# Patient Record
Sex: MALE | Race: WHITE | Employment: OTHER | URBAN - NONMETROPOLITAN AREA
[De-identification: names, ages, dates, MRNs, and addresses within clinical notes are randomized per-mention and may not be internally consistent; named-entity substitution may affect disease eponyms.]

---

## 2020-06-16 ENCOUNTER — HOSPITAL ENCOUNTER (EMERGENCY)
Facility: OTHER | Age: 50
Discharge: HOME | End: 2020-06-16
Attending: EMERGENCY MEDICINE | Admitting: EMERGENCY MEDICINE
Payer: COMMERCIAL

## 2020-06-16 VITALS
HEIGHT: 65 IN | OXYGEN SATURATION: 97 % | WEIGHT: 186.9 LBS | DIASTOLIC BLOOD PRESSURE: 74 MMHG | TEMPERATURE: 99 F | BODY MASS INDEX: 31.14 KG/M2 | HEART RATE: 86 BPM | SYSTOLIC BLOOD PRESSURE: 137 MMHG | RESPIRATION RATE: 20 BRPM

## 2020-06-16 DIAGNOSIS — Z87.19 HISTORY OF GI BLEED: ICD-10-CM

## 2020-06-16 DIAGNOSIS — R10.13 ABDOMINAL PAIN, EPIGASTRIC: ICD-10-CM

## 2020-06-16 DIAGNOSIS — R17 JAUNDICE: ICD-10-CM

## 2020-06-16 PROBLEM — H21.01 HYPHEMA, RIGHT EYE: Status: ACTIVE | Noted: 2017-06-07

## 2020-06-16 PROBLEM — N52.9 ERECTILE DYSFUNCTION: Status: ACTIVE | Noted: 2020-06-16

## 2020-06-16 PROBLEM — E66.9 OBESITY: Status: ACTIVE | Noted: 2020-06-16

## 2020-06-16 PROBLEM — F10.20 MODERATE ALCOHOL DEPENDENCE (H): Status: ACTIVE | Noted: 2020-06-16

## 2020-06-16 PROBLEM — M65.30 ACQUIRED TRIGGER FINGER: Status: ACTIVE | Noted: 2020-06-16

## 2020-06-16 PROBLEM — H93.19 TINNITUS: Status: ACTIVE | Noted: 2020-06-16

## 2020-06-16 PROBLEM — G47.00 INSOMNIA: Status: ACTIVE | Noted: 2020-06-16

## 2020-06-16 PROBLEM — F41.9 ANXIETY: Status: ACTIVE | Noted: 2020-06-16

## 2020-06-16 LAB
ALBUMIN SERPL-MCNC: 3.8 G/DL (ref 3.5–5.7)
ALP SERPL-CCNC: 97 U/L (ref 34–104)
ALT SERPL W P-5'-P-CCNC: 17 U/L (ref 7–52)
ANION GAP SERPL CALCULATED.3IONS-SCNC: 9 MMOL/L (ref 3–14)
AST SERPL W P-5'-P-CCNC: 42 U/L (ref 13–39)
BASOPHILS # BLD AUTO: 0 10E9/L (ref 0–0.2)
BASOPHILS NFR BLD AUTO: 0.5 %
BILIRUB SERPL-MCNC: 7.4 MG/DL (ref 0.3–1)
BUN SERPL-MCNC: 13 MG/DL (ref 7–25)
CALCIUM SERPL-MCNC: 8.6 MG/DL (ref 8.6–10.3)
CHLORIDE SERPL-SCNC: 105 MMOL/L (ref 98–107)
CO2 SERPL-SCNC: 23 MMOL/L (ref 21–31)
CREAT SERPL-MCNC: 0.96 MG/DL (ref 0.7–1.3)
DIFFERENTIAL METHOD BLD: ABNORMAL
EOSINOPHIL # BLD AUTO: 0.1 10E9/L (ref 0–0.7)
EOSINOPHIL NFR BLD AUTO: 1.6 %
ERYTHROCYTE [DISTWIDTH] IN BLOOD BY AUTOMATED COUNT: 30.8 % (ref 10–15)
GFR SERPL CREATININE-BSD FRML MDRD: 84 ML/MIN/{1.73_M2}
GLUCOSE SERPL-MCNC: 180 MG/DL (ref 70–105)
HCT VFR BLD AUTO: 27.5 % (ref 40–53)
HGB BLD-MCNC: 7.9 G/DL (ref 13.3–17.7)
IMM GRANULOCYTES # BLD: 0 10E9/L (ref 0–0.4)
IMM GRANULOCYTES NFR BLD: 0.5 %
LIPASE SERPL-CCNC: 9 U/L (ref 11–82)
LYMPHOCYTES # BLD AUTO: 0.7 10E9/L (ref 0.8–5.3)
LYMPHOCYTES NFR BLD AUTO: 15.7 %
MCH RBC QN AUTO: 20.7 PG (ref 26.5–33)
MCHC RBC AUTO-ENTMCNC: 28.7 G/DL (ref 31.5–36.5)
MCV RBC AUTO: 72 FL (ref 78–100)
MONOCYTES # BLD AUTO: 0.4 10E9/L (ref 0–1.3)
MONOCYTES NFR BLD AUTO: 8.6 %
NEUTROPHILS # BLD AUTO: 3.1 10E9/L (ref 1.6–8.3)
NEUTROPHILS NFR BLD AUTO: 73.1 %
PLATELET # BLD AUTO: 63 10E9/L (ref 150–450)
POTASSIUM SERPL-SCNC: 3.4 MMOL/L (ref 3.5–5.1)
PROT SERPL-MCNC: 6.6 G/DL (ref 6.4–8.9)
RBC # BLD AUTO: 3.82 10E12/L (ref 4.4–5.9)
SODIUM SERPL-SCNC: 137 MMOL/L (ref 134–144)
WBC # BLD AUTO: 4.3 10E9/L (ref 4–11)

## 2020-06-16 PROCEDURE — 99283 EMERGENCY DEPT VISIT LOW MDM: CPT | Mod: Z6 | Performed by: EMERGENCY MEDICINE

## 2020-06-16 PROCEDURE — 25000125 ZZHC RX 250: Performed by: EMERGENCY MEDICINE

## 2020-06-16 PROCEDURE — 83690 ASSAY OF LIPASE: CPT | Performed by: EMERGENCY MEDICINE

## 2020-06-16 PROCEDURE — 99283 EMERGENCY DEPT VISIT LOW MDM: CPT | Performed by: EMERGENCY MEDICINE

## 2020-06-16 PROCEDURE — 36415 COLL VENOUS BLD VENIPUNCTURE: CPT | Performed by: EMERGENCY MEDICINE

## 2020-06-16 PROCEDURE — 85025 COMPLETE CBC W/AUTO DIFF WBC: CPT | Performed by: EMERGENCY MEDICINE

## 2020-06-16 PROCEDURE — 80053 COMPREHEN METABOLIC PANEL: CPT | Performed by: EMERGENCY MEDICINE

## 2020-06-16 PROCEDURE — 25000132 ZZH RX MED GY IP 250 OP 250 PS 637: Performed by: EMERGENCY MEDICINE

## 2020-06-16 RX ORDER — ALUMINA, MAGNESIA, AND SIMETHICONE 2400; 2400; 240 MG/30ML; MG/30ML; MG/30ML
15 SUSPENSION ORAL ONCE
Status: COMPLETED | OUTPATIENT
Start: 2020-06-16 | End: 2020-06-16

## 2020-06-16 RX ORDER — PANTOPRAZOLE SODIUM 40 MG/1
40 TABLET, DELAYED RELEASE ORAL DAILY
COMMUNITY

## 2020-06-16 RX ORDER — SUCRALFATE 1 G/1
1 TABLET ORAL 4 TIMES DAILY
Qty: 50 TABLET | Refills: 0 | Status: SHIPPED | OUTPATIENT
Start: 2020-06-16 | End: 2022-11-10

## 2020-06-16 RX ORDER — LIDOCAINE HYDROCHLORIDE 20 MG/ML
15 SOLUTION OROPHARYNGEAL ONCE
Status: COMPLETED | OUTPATIENT
Start: 2020-06-16 | End: 2020-06-16

## 2020-06-16 RX ORDER — FERROUS SULFATE 324(65)MG
324 TABLET, DELAYED RELEASE (ENTERIC COATED) ORAL
COMMUNITY
End: 2022-11-10

## 2020-06-16 RX ORDER — SUCRALFATE 1 G/1
1 TABLET ORAL ONCE
Status: COMPLETED | OUTPATIENT
Start: 2020-06-16 | End: 2020-06-16

## 2020-06-16 RX ADMIN — ALUMINUM HYDROXIDE, MAGNESIUM HYDROXIDE, AND DIMETHICONE 15 ML: 400; 400; 40 SUSPENSION ORAL at 00:50

## 2020-06-16 RX ADMIN — SUCRALFATE 1 G: 1 TABLET ORAL at 01:41

## 2020-06-16 RX ADMIN — LIDOCAINE HYDROCHLORIDE 15 ML: 20 SOLUTION ORAL; TOPICAL at 00:50

## 2020-06-16 ASSESSMENT — ENCOUNTER SYMPTOMS
BACK PAIN: 1
LIGHT-HEADEDNESS: 0
NAUSEA: 0
DYSURIA: 0
FEVER: 0
ABDOMINAL PAIN: 1
CONSTIPATION: 0
VOMITING: 0
SHORTNESS OF BREATH: 0
DIARRHEA: 0
BLOOD IN STOOL: 0
AGITATION: 0
CHILLS: 0
CHEST TIGHTNESS: 0

## 2020-06-16 ASSESSMENT — MIFFLIN-ST. JEOR: SCORE: 1644.65

## 2020-06-16 NOTE — ED TRIAGE NOTES
[facility] and was [facility] for anemia with hemoglobin of 5. He states he had scopes and they found bleeding which resolved. He states he was told to seek care if he had pain across his back or abdomen. Has this pain, [facility] who [facility] care,

## 2020-06-16 NOTE — ED PROVIDER NOTES
History     Chief Complaint   Patient presents with     Back Pain     HPI  Marlon Guy is a 50 year old male who is here complaining of some back and abdominal pain.  He states that he was just admitted at the [facility] recently for some GI bleeding.  He states on admission his hemoglobin was 5.  He does report that for a week or 2 prior to this he was extremely fatigued.  He received transfusion.  He states that they found some bleeding in his stomach on EGD which she said they treated.  He was told that if he developed any abdominal or back pain that he should be seen right away.  He did not have any pain like this while hospitalized or in the weeks leading up to this.  Today he did develop this pain which  he describes as a pain rating across his lower back and he points to his upper lumbar area.  He also has some pressure under his ribs.  He states this began just after he started eating today.  Has not been ill otherwise.  Denies chest pain heaviness tightness or squeezing.  Has not noticed any black or bloody stools, he is on iron.    Allergies:  Allergies   Allergen Reactions     Amoxicillin Rash       Problem List:    Patient Active Problem List    Diagnosis Date Noted     Acquired trigger finger 06/16/2020     Priority: Medium     Jun 22, 2017 Entered By: ALESSANDRA ALDRICH Comment: left handed       Anxiety 06/16/2020     Priority: Medium     Erectile dysfunction 06/16/2020     Priority: Medium     Insomnia 06/16/2020     Priority: Medium     Moderate alcohol dependence (H) 06/16/2020     Priority: Medium     Obesity 06/16/2020     Priority: Medium     Tinnitus 06/16/2020     Priority: Medium     Hyphema, right eye 06/07/2017     Priority: Medium     Alcoholic fatty liver 12/14/2012     Priority: Medium     Abnormal LFTs 12/13/2012     Priority: Medium     Acute alcoholic pancreatitis 12/12/2012     Priority: Medium     Pulmonary nodule 12/12/2012     Priority: Medium     5 mm possible nodule, right  middle lobe seen on CT abd done for   pancreatitis       Cyst of skin 05/31/2012     Priority: Medium     Internal hemorrhoids 01/19/2012     Priority: Medium     Obstructive sleep apnea 07/09/2010     Priority: Medium     Setting: CPAP 12 cmH20 Transcend   Supplied by: [facility]  PSG done:  8-5-10  AHI  16  RDI  23  Lowest O2 Sat:  86%  ; Moderate Obstructive sleep apnea       Esophageal reflux 03/27/2008     Priority: Medium     EGD findings 5/30/2008:   Impression: - LA Grade C reflux esophagitis.   - Gaping lower esophageal sphincter.   - Non-bleeding erosive gastropathy.   - Gastric mucosal abnormality   characterized by erythema.   Recommendation: - Follow an antireflux regimen   indefinitely.   - Use Prilosec (omeprazole) at 20 mg PO   BID indefinitely.  ; Gastroesophageal Reflux Disease       Rosacea 03/27/2008     Priority: Medium     Rosacea          Past Medical History:    Past Medical History:   Diagnosis Date     Anemia        Past Surgical History:    Past Surgical History:   Procedure Laterality Date     ORTHOPEDIC SURGERY         Family History:    History reviewed. No pertinent family history.    Social History:  Marital Status:   [2]  Social History     Tobacco Use     Smoking status: None   Substance Use Topics     Alcohol use: None     Drug use: None        Medications:    Ferrous Sulfate 324 (65 Fe) MG TBEC  FLUoxetine (PROZAC) 20 MG capsule  pantoprazole (PROTONIX) 40 MG EC tablet  sucralfate (CARAFATE) 1 GM tablet          Review of Systems   Constitutional: Negative for chills and fever.   HENT: Negative for congestion.    Eyes: Negative for visual disturbance.   Respiratory: Negative for chest tightness and shortness of breath.    Cardiovascular: Negative for chest pain.   Gastrointestinal: Positive for abdominal pain. Negative for blood in stool, constipation, diarrhea, nausea and vomiting.   Genitourinary: Negative for dysuria.   Musculoskeletal: Positive for back pain.   Skin: Negative  "for rash.   Neurological: Negative for light-headedness.   Psychiatric/Behavioral: Negative for agitation.       Physical Exam   BP: 137/74  Pulse: 86  Temp: 99  F (37.2  C)  Resp: 20  Height: 165.1 cm (5' 5\")  Weight: 84.8 kg (186 lb 14.4 oz)  SpO2: 97 %      Physical Exam  Vitals signs and nursing note reviewed.   Constitutional:       Appearance: Normal appearance.   HENT:      Head: Normocephalic and atraumatic.      Mouth/Throat:      Mouth: Mucous membranes are moist.   Eyes:      General: Scleral icterus present.      Conjunctiva/sclera: Conjunctivae normal.   Cardiovascular:      Rate and Rhythm: Normal rate and regular rhythm.      Heart sounds: Normal heart sounds.   Pulmonary:      Effort: Pulmonary effort is normal.      Breath sounds: Normal breath sounds.   Abdominal:      General: Abdomen is flat. Bowel sounds are normal. There is no distension.      Palpations: Abdomen is soft.      Comments: Tender right upper quadrant   Skin:     General: Skin is warm and dry.      Coloration: Skin is jaundiced.   Neurological:      Mental Status: He is alert and oriented to person, place, and time.   Psychiatric:         Mood and Affect: Mood normal.         Behavior: Behavior normal.         ED Course        Procedures                   Results for orders placed or performed during the hospital encounter of 06/16/20 (from the past 24 hour(s))   CBC with platelets differential   Result Value Ref Range    WBC 4.3 4.0 - 11.0 10e9/L    RBC Count 3.82 (L) 4.4 - 5.9 10e12/L    Hemoglobin 7.9 (L) 13.3 - 17.7 g/dL    Hematocrit 27.5 (L) 40.0 - 53.0 %    MCV 72 (L) 78 - 100 fl    MCH 20.7 (L) 26.5 - 33.0 pg    MCHC 28.7 (L) 31.5 - 36.5 g/dL    RDW 30.8 (H) 10.0 - 15.0 %    Platelet Count 63 (L) 150 - 450 10e9/L    Diff Method Automated Method     % Neutrophils 73.1 %    % Lymphocytes 15.7 %    % Monocytes 8.6 %    % Eosinophils 1.6 %    % Basophils 0.5 %    % Immature Granulocytes 0.5 %    Absolute Neutrophil 3.1 1.6 - " 8.3 10e9/L    Absolute Lymphocytes 0.7 (L) 0.8 - 5.3 10e9/L    Absolute Monocytes 0.4 0.0 - 1.3 10e9/L    Absolute Eosinophils 0.1 0.0 - 0.7 10e9/L    Absolute Basophils 0.0 0.0 - 0.2 10e9/L    Abs Immature Granulocytes 0.0 0 - 0.4 10e9/L   Comprehensive metabolic panel   Result Value Ref Range    Sodium 137 134 - 144 mmol/L    Potassium 3.4 (L) 3.5 - 5.1 mmol/L    Chloride 105 98 - 107 mmol/L    Carbon Dioxide 23 21 - 31 mmol/L    Anion Gap 9 3 - 14 mmol/L    Glucose 180 (H) 70 - 105 mg/dL    Urea Nitrogen 13 7 - 25 mg/dL    Creatinine 0.96 0.70 - 1.30 mg/dL    GFR Estimate 84 >60 mL/min/[1.73_m2]    GFR Estimate If Black >90 >60 mL/min/[1.73_m2]    Calcium 8.6 8.6 - 10.3 mg/dL    Bilirubin Total 7.4 (H) 0.3 - 1.0 mg/dL    Albumin 3.8 3.5 - 5.7 g/dL    Protein Total 6.6 6.4 - 8.9 g/dL    Alkaline Phosphatase 97 34 - 104 U/L    ALT 17 7 - 52 U/L    AST 42 (H) 13 - 39 U/L   Lipase   Result Value Ref Range    Lipase 9 (L) 11 - 82 U/L       Medications   alum & mag hydroxide-simethicone (MAALOX  ES) suspension 15 mL (15 mLs Oral Given 6/16/20 0050)     And   lidocaine (XYLOCAINE) 2 % solution 15 mL (15 mLs Mouth/Throat Given 6/16/20 0050)   sucralfate (CARAFATE) tablet 1 g (1 g Oral Given 6/16/20 0141)       Assessments & Plan (with Medical Decision Making)     I have reviewed the nursing notes.    I have reviewed the findings, diagnosis, plan and need for follow up with the patient.  [facility]  I was actually to review labs on chart review in the computer system, however I was unable to see any progress notes or discharge summaries from his admission.  His hemoglobin was 9.0 on discharge.  His bilirubin was 9.4.  Today hemoglobin 7.9 and bilirubin 8.4.  Other liver tests are unremarkable.  Patient felt better after a GI cocktail.  Was also given some Carafate.  Vital signs are stable.  At this time he is discharged to home with prescription for some Carafate.  He is told to keep a close eye on  things and if he does develop black bloody or tarry stools, or if he starts feeling weak dizzy lightheaded like he did prior to his previous admission he should come back right away.  I told him I felt it was important for him to get in for a recheck on that hemoglobin by the end of the week.  If he is unable to arrange any type of clinic visit through the [facility] he should return to the emergency department for this.    Discharge Medication List as of 6/16/2020  1:43 AM      START taking these medications    Details   sucralfate (CARAFATE) 1 GM tablet Take 1 tablet (1 g) by mouth 4 times daily, Disp-50 tablet,R-0, Local Print             Final diagnoses:   Abdominal pain, epigastric   History of GI bleed   Jaundice       6/16/2020   [facility]     Gus Dangelo MD  06/16/20 0061

## 2020-06-16 NOTE — ED AVS SNAPSHOT
[facility]  [address]  [place] [address]  Phone:  [phone]  Fax:  [phone]                                    Marlon Guy   MRN: [id number]    Department:  [facility]   Date of Visit:  6/16/2020           After Visit Summary Signature Page    I have received my discharge instructions, and my questions have been answered. I have discussed any challenges I see with this plan with the nurse or doctor.    ..........................................................................................................................................  Patient/Patient Representative Signature      ..........................................................................................................................................  Patient Representative Print Name and Relationship to Patient    ..................................................               ................................................  Date                                   Time    ..........................................................................................................................................  Reviewed by Signature/Title    ...................................................              ..............................................  Date                                               Time          22EPIC Rev 08/18

## 2020-10-02 ENCOUNTER — TELEPHONE (OUTPATIENT)
Dept: SURGERY | Facility: OTHER | Age: 50
End: 2020-10-02

## 2020-10-02 NOTE — TELEPHONE ENCOUNTER
[facility] with [facility].  Diagnosis is iron deficiency anemia.  Notes have been faxed .  Please advise.  Thank you. Stephenie Carmona on 10/2/2020 at 10:26 AM

## 2022-10-31 ENCOUNTER — HOSPITAL ENCOUNTER (EMERGENCY)
Facility: HOSPITAL | Age: 52
Discharge: HOME | End: 2022-10-31
Attending: PHYSICIAN ASSISTANT | Admitting: PHYSICIAN ASSISTANT
Payer: COMMERCIAL

## 2022-10-31 ENCOUNTER — APPOINTMENT (OUTPATIENT)
Dept: GENERAL RADIOLOGY | Facility: HOSPITAL | Age: 52
End: 2022-10-31
Attending: PHYSICIAN ASSISTANT
Payer: COMMERCIAL

## 2022-10-31 ENCOUNTER — APPOINTMENT (OUTPATIENT)
Dept: CT IMAGING | Facility: HOSPITAL | Age: 52
End: 2022-10-31
Attending: PHYSICIAN ASSISTANT
Payer: COMMERCIAL

## 2022-10-31 VITALS
RESPIRATION RATE: 16 BRPM | TEMPERATURE: 98 F | BODY MASS INDEX: 31.92 KG/M2 | SYSTOLIC BLOOD PRESSURE: 138 MMHG | OXYGEN SATURATION: 95 % | DIASTOLIC BLOOD PRESSURE: 78 MMHG | HEART RATE: 77 BPM | WEIGHT: 191.8 LBS

## 2022-10-31 DIAGNOSIS — S19.9XXA INJURY OF NECK, INITIAL ENCOUNTER: ICD-10-CM

## 2022-10-31 DIAGNOSIS — S43.51XA SPRAIN OF RIGHT ACROMIOCLAVICULAR LIGAMENT, INITIAL ENCOUNTER: ICD-10-CM

## 2022-10-31 PROCEDURE — G0463 HOSPITAL OUTPT CLINIC VISIT: HCPCS

## 2022-10-31 PROCEDURE — G0463 HOSPITAL OUTPT CLINIC VISIT: HCPCS | Mod: 25

## 2022-10-31 PROCEDURE — 72125 CT NECK SPINE W/O DYE: CPT

## 2022-10-31 PROCEDURE — 73000 X-RAY EXAM OF COLLAR BONE: CPT | Mod: RT

## 2022-10-31 PROCEDURE — 70450 CT HEAD/BRAIN W/O DYE: CPT

## 2022-10-31 PROCEDURE — 99213 OFFICE O/P EST LOW 20 MIN: CPT | Performed by: PHYSICIAN ASSISTANT

## 2022-10-31 PROCEDURE — 250N000013 HC RX MED GY IP 250 OP 250 PS 637: Performed by: PHYSICIAN ASSISTANT

## 2022-10-31 RX ORDER — OXYCODONE AND ACETAMINOPHEN 5; 325 MG/1; MG/1
1 TABLET ORAL ONCE
Status: COMPLETED | OUTPATIENT
Start: 2022-10-31 | End: 2022-10-31

## 2022-10-31 RX ORDER — HYDROCODONE BITARTRATE AND ACETAMINOPHEN 5; 325 MG/1; MG/1
1 TABLET ORAL EVERY 6 HOURS PRN
Qty: 12 TABLET | Refills: 0 | Status: SHIPPED | OUTPATIENT
Start: 2022-10-31 | End: 2022-11-03

## 2022-10-31 RX ADMIN — OXYCODONE HYDROCHLORIDE AND ACETAMINOPHEN 1 TABLET: 5; 325 TABLET ORAL at 17:27

## 2022-10-31 ASSESSMENT — ENCOUNTER SYMPTOMS
VOMITING: 0
HEADACHES: 0
CARDIOVASCULAR NEGATIVE: 1
MYALGIAS: 1
NECK STIFFNESS: 1
NECK PAIN: 1
PSYCHIATRIC NEGATIVE: 1
CONSTITUTIONAL NEGATIVE: 1
RESPIRATORY NEGATIVE: 1
ARTHRALGIAS: 1

## 2022-10-31 ASSESSMENT — ACTIVITIES OF DAILY LIVING (ADL)
ADLS_ACUITY_SCORE: 35
ADLS_ACUITY_SCORE: 35

## 2022-10-31 NOTE — DISCHARGE INSTRUCTIONS
No bleed into head, no neck fracture, no clavicle/shoulder fracture.   The lump on the neck muscle is not helping - try warm packs and gentle stretching as long as it does NOT make things worse.   May trial muscle relaxant for the neck. It will probably make you sleepy. If it does not seem to be helping, may stop it.   Lortab for severe pain (at night, right away in AM) is fine for a few days. Be mindful of the tylenol content - you should not have more than 4000mg in 24 hrs.   The shoulder/clavicle is likely a sprain of the AC joint.   FYI over the counter topicals: Arnica Cream (8-10$ at [employer]) and/or Capsaicin. (1/4 teaspoon cayenne pepper in a palmful olive oil and rub in 2-3 times daily for 5-7 days for pain)  BACK HERE WITH: pain or swelling out of proportion, concern for profound numbness/range of motion/strength down the RT arm.
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
## 2022-11-01 RX ORDER — CYCLOBENZAPRINE HCL 10 MG
10 TABLET ORAL 3 TIMES DAILY PRN
Qty: 20 TABLET | Refills: 0 | Status: SHIPPED | OUTPATIENT
Start: 2022-11-01 | End: 2022-11-07

## 2022-11-01 NOTE — ED PROVIDER NOTES
Spoke with Aroldo she had told the patient she would prescribe Flexeril.  Was not on discharge orders.  Flexeril 10 mg 3 times daily as needed #20 tabs prescribed     Aster Perry, CNP  11/01/22 0238

## 2022-11-04 ENCOUNTER — APPOINTMENT (OUTPATIENT)
Dept: ULTRASOUND IMAGING | Facility: HOSPITAL | Age: 52
End: 2022-11-04
Attending: EMERGENCY MEDICINE

## 2022-11-04 ENCOUNTER — HOSPITAL ENCOUNTER (EMERGENCY)
Facility: HOSPITAL | Age: 52
Discharge: HOME | End: 2022-11-04
Attending: EMERGENCY MEDICINE | Admitting: EMERGENCY MEDICINE

## 2022-11-04 VITALS
TEMPERATURE: 98.3 F | HEIGHT: 65 IN | DIASTOLIC BLOOD PRESSURE: 72 MMHG | WEIGHT: 190 LBS | BODY MASS INDEX: 31.65 KG/M2 | RESPIRATION RATE: 18 BRPM | HEART RATE: 78 BPM | OXYGEN SATURATION: 100 % | SYSTOLIC BLOOD PRESSURE: 140 MMHG

## 2022-11-04 DIAGNOSIS — E87.1 HYPONATREMIA: ICD-10-CM

## 2022-11-04 DIAGNOSIS — L03.115 CELLULITIS OF RIGHT LOWER LEG: ICD-10-CM

## 2022-11-04 LAB
ANION GAP SERPL CALCULATED.3IONS-SCNC: 9 MMOL/L (ref 7–15)
BASOPHILS # BLD AUTO: 0.1 10E3/UL (ref 0–0.2)
BASOPHILS NFR BLD AUTO: 0 %
BUN SERPL-MCNC: 22.3 MG/DL (ref 6–20)
CALCIUM SERPL-MCNC: 7.8 MG/DL (ref 8.6–10)
CHLORIDE SERPL-SCNC: 91 MMOL/L (ref 98–107)
CREAT SERPL-MCNC: 0.93 MG/DL (ref 0.67–1.17)
CRP SERPL-MCNC: 103.67 MG/L
DEPRECATED HCO3 PLAS-SCNC: 22 MMOL/L (ref 22–29)
EOSINOPHIL # BLD AUTO: 0.1 10E3/UL (ref 0–0.7)
EOSINOPHIL NFR BLD AUTO: 1 %
ERYTHROCYTE [DISTWIDTH] IN BLOOD BY AUTOMATED COUNT: 21.1 % (ref 10–15)
ERYTHROCYTE [SEDIMENTATION RATE] IN BLOOD BY WESTERGREN METHOD: 37 MM/HR (ref 0–20)
GFR SERPL CREATININE-BSD FRML MDRD: >90 ML/MIN/1.73M2
GLUCOSE SERPL-MCNC: 130 MG/DL (ref 70–99)
HCT VFR BLD AUTO: 32.8 % (ref 40–53)
HGB BLD-MCNC: 10.5 G/DL (ref 13.3–17.7)
IMM GRANULOCYTES # BLD: 0.4 10E3/UL
IMM GRANULOCYTES NFR BLD: 3 %
INR PPP: 2.09 (ref 0.85–1.15)
LYMPHOCYTES # BLD AUTO: 1.5 10E3/UL (ref 0.8–5.3)
LYMPHOCYTES NFR BLD AUTO: 8 %
MCH RBC QN AUTO: 26.1 PG (ref 26.5–33)
MCHC RBC AUTO-ENTMCNC: 32 G/DL (ref 31.5–36.5)
MCV RBC AUTO: 82 FL (ref 78–100)
MONOCYTES # BLD AUTO: 1.6 10E3/UL (ref 0–1.3)
MONOCYTES NFR BLD AUTO: 9 %
NEUTROPHILS # BLD AUTO: 13.7 10E3/UL (ref 1.6–8.3)
NEUTROPHILS NFR BLD AUTO: 79 %
NRBC # BLD AUTO: 0 10E3/UL
NRBC BLD AUTO-RTO: 0 /100
PLAT MORPH BLD: ABNORMAL
PLATELET # BLD AUTO: 76 10E3/UL (ref 150–450)
POTASSIUM SERPL-SCNC: 4.1 MMOL/L (ref 3.4–5.3)
RBC # BLD AUTO: 4.02 10E6/UL (ref 4.4–5.9)
RBC MORPH BLD: ABNORMAL
SODIUM SERPL-SCNC: 122 MMOL/L (ref 136–145)
WBC # BLD AUTO: 17.4 10E3/UL (ref 4–11)

## 2022-11-04 PROCEDURE — 99284 EMERGENCY DEPT VISIT MOD MDM: CPT | Performed by: EMERGENCY MEDICINE

## 2022-11-04 PROCEDURE — 85610 PROTHROMBIN TIME: CPT | Performed by: EMERGENCY MEDICINE

## 2022-11-04 PROCEDURE — 36415 COLL VENOUS BLD VENIPUNCTURE: CPT | Performed by: EMERGENCY MEDICINE

## 2022-11-04 PROCEDURE — 85652 RBC SED RATE AUTOMATED: CPT | Performed by: EMERGENCY MEDICINE

## 2022-11-04 PROCEDURE — 99285 EMERGENCY DEPT VISIT HI MDM: CPT | Mod: 25

## 2022-11-04 PROCEDURE — 96365 THER/PROPH/DIAG IV INF INIT: CPT

## 2022-11-04 PROCEDURE — 250N000011 HC RX IP 250 OP 636: Performed by: EMERGENCY MEDICINE

## 2022-11-04 PROCEDURE — 86140 C-REACTIVE PROTEIN: CPT | Performed by: EMERGENCY MEDICINE

## 2022-11-04 PROCEDURE — 85004 AUTOMATED DIFF WBC COUNT: CPT | Performed by: EMERGENCY MEDICINE

## 2022-11-04 PROCEDURE — 93971 EXTREMITY STUDY: CPT | Mod: RT

## 2022-11-04 PROCEDURE — 80048 BASIC METABOLIC PNL TOTAL CA: CPT | Performed by: EMERGENCY MEDICINE

## 2022-11-04 PROCEDURE — 258N000003 HC RX IP 258 OP 636: Performed by: EMERGENCY MEDICINE

## 2022-11-04 RX ORDER — HYDROCODONE BITARTRATE AND ACETAMINOPHEN 5; 325 MG/1; MG/1
1 TABLET ORAL EVERY 6 HOURS PRN
Qty: 18 TABLET | Refills: 0 | Status: SHIPPED | OUTPATIENT
Start: 2022-11-04 | End: 2022-11-07

## 2022-11-04 RX ORDER — SODIUM CHLORIDE 9 MG/ML
INJECTION, SOLUTION INTRAVENOUS CONTINUOUS
Status: DISCONTINUED | OUTPATIENT
Start: 2022-11-04 | End: 2022-11-04 | Stop reason: HOSPADM

## 2022-11-04 RX ORDER — CLINDAMYCIN HCL 300 MG
300 CAPSULE ORAL 4 TIMES DAILY
Qty: 40 CAPSULE | Refills: 0 | Status: ON HOLD | OUTPATIENT
Start: 2022-11-04 | End: 2022-11-12

## 2022-11-04 RX ORDER — CLINDAMYCIN PHOSPHATE 600 MG/50ML
600 INJECTION, SOLUTION INTRAVENOUS EVERY 8 HOURS
Status: DISCONTINUED | OUTPATIENT
Start: 2022-11-04 | End: 2022-11-04 | Stop reason: HOSPADM

## 2022-11-04 RX ADMIN — SODIUM CHLORIDE 1000 ML: 9 INJECTION, SOLUTION INTRAVENOUS at 13:33

## 2022-11-04 RX ADMIN — CLINDAMYCIN IN 5 PERCENT DEXTROSE 600 MG: 12 INJECTION, SOLUTION INTRAVENOUS at 13:33

## 2022-11-04 ASSESSMENT — ACTIVITIES OF DAILY LIVING (ADL): ADLS_ACUITY_SCORE: 35

## 2022-11-04 NOTE — ED NOTES
Right lower extremity swollen with multiple open areas and some blistering CMS intact. 4 plus edema.pt states pain is 9/10.

## 2022-11-04 NOTE — ED NOTES
adb pad with ace to right lower leg CMS intact post dressing xxl slippers given patient shoe was too tight. Pt verbalizes understanding of discharge and follow up

## 2022-11-04 NOTE — ED PROVIDER NOTES
History     Chief Complaint   Patient presents with     Leg Swelling     HPI  Marlon Guy is a 52 year old male who comes to the ED complaining of swelling, pain and redness in his right lower leg which started 2 days ago.  He denies any trauma.  He states he had a significant fracture of the right lower leg 4 years ago which required ORIF.  He denies fevers or chills.  Was seen by PMD who referred the patient here for further evaluation.  No history of DVT.  Otherwise states he's doing well.  Offers no other complaints.    Allergies:  Allergies   Allergen Reactions     Amoxicillin Rash       Problem List:    Patient Active Problem List    Diagnosis Date Noted     Acquired trigger finger 06/16/2020     Priority: Medium     Jun 22, 2017 Entered By: ALESSANDRA ALDRICH Comment: left handed       Anxiety 06/16/2020     Priority: Medium     Erectile dysfunction 06/16/2020     Priority: Medium     Insomnia 06/16/2020     Priority: Medium     Moderate alcohol dependence (H) 06/16/2020     Priority: Medium     Obesity 06/16/2020     Priority: Medium     Tinnitus 06/16/2020     Priority: Medium     Hyphema, right eye 06/07/2017     Priority: Medium     Alcoholic fatty liver 12/14/2012     Priority: Medium     Abnormal LFTs 12/13/2012     Priority: Medium     Acute alcoholic pancreatitis 12/12/2012     Priority: Medium     Pulmonary nodule 12/12/2012     Priority: Medium     5 mm possible nodule, right middle lobe seen on CT abd done for   pancreatitis       Cyst of skin 05/31/2012     Priority: Medium     Internal hemorrhoids 01/19/2012     Priority: Medium     Obstructive sleep apnea 07/09/2010     Priority: Medium     Setting: CPAP 12 cmH20 Transcend   Supplied by: [facility]  PSG done:  8-5-10  AHI  16  RDI  23  Lowest O2 Sat:  86%  ; Moderate Obstructive sleep apnea       Esophageal reflux 03/27/2008     Priority: Medium     EGD findings 5/30/2008:   Impression: - LA Grade C reflux esophagitis.   - Gaping lower esophageal  "sphincter.   - Non-bleeding erosive gastropathy.   - Gastric mucosal abnormality   characterized by erythema.   Recommendation: - Follow an antireflux regimen   indefinitely.   - Use Prilosec (omeprazole) at 20 mg PO   BID indefinitely.  ; Gastroesophageal Reflux Disease       Rosacea 03/27/2008     Priority: Medium     Rosacea          Past Medical History:    Past Medical History:   Diagnosis Date     Anemia        Past Surgical History:    Past Surgical History:   Procedure Laterality Date     ORTHOPEDIC SURGERY         Family History:    History reviewed. No pertinent family history.    Social History:  Marital Status:   [2]  Social History     Tobacco Use     Smoking status: Former     Types: Cigarettes     Smokeless tobacco: Current     Types: Chew   Substance Use Topics     Alcohol use: Yes     Alcohol/week: 50.0 standard drinks     Types: 50 Cans of beer per week     Comment: daily     Drug use: Never        Medications:    cyclobenzaprine (FLEXERIL) 10 MG tablet  Ferrous Sulfate 324 (65 Fe) MG TBEC  FLUoxetine (PROZAC) 20 MG capsule  pantoprazole (PROTONIX) 40 MG EC tablet  sucralfate (CARAFATE) 1 GM tablet          Review of Systems   Constitutional: Negative.    HENT: Negative.    Eyes: Negative.    Respiratory: Negative.    Cardiovascular: Negative.    Gastrointestinal: Negative.    Endocrine: Negative.    Genitourinary: Negative.    Musculoskeletal:        The see history of chief complaint   Skin:        Please see history of chief complaint   Neurological: Negative.    all other systems reviewed and found unremarkable    Physical Exam   BP: 120/73  Pulse: 72  Temp: 99.4  F (37.4  C)  Resp: 16  Height: 165.1 cm (5' 5\")  Weight: 86.2 kg (190 lb)  SpO2: 98 %      Physical Exam-year-old gentleman who is awake alert oriented person place and time.  Very pleasant and cooperative.  HEENT normocephalic extraocular muscles intact pupils equally round and reactive to light and oropharynx is clear.  Neck " "supple forage motion without pain.  Lungs are clear bilaterally.  Heart maintains regular rate and rhythm.  S1 and S2 sounds are appreciated.  The abdomen is soft and nontender.  Extremities a full range of motion 5/5 strength patient is moderate soft tissue swelling of his right lower leg.  There are multiple insect bite type wounds which the patient states are from \"chigger bites\" there is also a dime sized wound in the proximal right lower leg.  Patient has brisk peripheral pulses brisk capillary refill no sensory deficit.  There are some mild erythema noted as well.  Please see the accompanying photo.  Neurologic exam no focal deficit.  Dermatologic exam no diffuse skin rashes or lesions are noted.    ED Course              ED Course as of 11/04/22 1334   Fri Nov 04, 2022   1329 The patient remained stable throughout his stay in the department.  We will provide hydration with normal saline to address the patient's sodium and also start IV antibiotics.  He will be discharged on clindamycin.  He will be given appropriate discharge instructions and I will advise him to follow-up with his primary care provider soon as possible this coming week.                         Results for orders placed or performed during the hospital encounter of 11/04/22 (from the past 24 hour(s))   CBC with platelets differential    Narrative    The following orders were created for panel order CBC with platelets differential.  Procedure                               Abnormality         Status                     ---------                               -----------         ------                     CBC with platelets and d...[[id number]]  Abnormal            Final result               RBC and Platelet Morphology[[id number]]  Abnormal            Final result                 Please view results for these tests on the individual orders.   Basic metabolic panel   Result Value Ref Range    Sodium 122 (L) 136 - 145 mmol/L    Potassium 4.1 3.4 - " 5.3 mmol/L    Chloride 91 (L) 98 - 107 mmol/L    Carbon Dioxide (CO2) 22 22 - 29 mmol/L    Anion Gap 9 7 - 15 mmol/L    Urea Nitrogen 22.3 (H) 6.0 - 20.0 mg/dL    Creatinine 0.93 0.67 - 1.17 mg/dL    Calcium 7.8 (L) 8.6 - 10.0 mg/dL    Glucose 130 (H) 70 - 99 mg/dL    GFR Estimate >90 >60 mL/min/1.73m2   CRP inflammation   Result Value Ref Range    CRP Inflammation 103.67 (H) <5.00 mg/L   INR   Result Value Ref Range    INR 2.09 (H) 0.85 - 1.15   Erythrocyte sedimentation rate auto   Result Value Ref Range    Erythrocyte Sedimentation Rate 37 (H) 0 - 20 mm/hr   CBC with platelets and differential   Result Value Ref Range    WBC Count 17.4 (H) 4.0 - 11.0 10e3/uL    RBC Count 4.02 (L) 4.40 - 5.90 10e6/uL    Hemoglobin 10.5 (L) 13.3 - 17.7 g/dL    Hematocrit 32.8 (L) 40.0 - 53.0 %    MCV 82 78 - 100 fL    MCH 26.1 (L) 26.5 - 33.0 pg    MCHC 32.0 31.5 - 36.5 g/dL    RDW 21.1 (H) 10.0 - 15.0 %    Platelet Count 76 (L) 150 - 450 10e3/uL    % Neutrophils 79 %    % Lymphocytes 8 %    % Monocytes 9 %    % Eosinophils 1 %    % Basophils 0 %    % Immature Granulocytes 3 %    NRBCs per 100 WBC 0 <1 /100    Absolute Neutrophils 13.7 (H) 1.6 - 8.3 10e3/uL    Absolute Lymphocytes 1.5 0.8 - 5.3 10e3/uL    Absolute Monocytes 1.6 (H) 0.0 - 1.3 10e3/uL    Absolute Eosinophils 0.1 0.0 - 0.7 10e3/uL    Absolute Basophils 0.1 0.0 - 0.2 10e3/uL    Absolute Immature Granulocytes 0.4 <=0.4 10e3/uL    Absolute NRBCs 0.0 10e3/uL   RBC and Platelet Morphology   Result Value Ref Range    Platelet Assessment (A) Automated Count Confirmed. Platelet morphology is normal.     Automated Count Confirmed. Giant platelets are present.    RBC Morphology Confirmed RBC Indices    US Lower Extremity Venous Duplex Right    Narrative    Exam:US LOWER EXTREMITY VENOUS DUPLEX RIGHT    History: 52 years Male right lower extremity swelling and redness.    Comparisons:None    Technique: Venous duplex ultrasonography of the right lower extremity  was performed.      Findings: The common femoral vein, superficial femoral vein and  popliteal vein are fully compressible with spontaneous and augmentable  venous flow.           Impression    Impression: No evidence of deep venous thrombosis within the right  lower extremity.    OFE WALLER MD         SYSTEM ID:  [id number]       Medications   clindamycin (CLEOCIN) infusion 600 mg (600 mg Intravenous New Bag 11/4/22 1333)   0.9% sodium chloride BOLUS (1,000 mLs Intravenous New Bag 11/4/22 1333)     Followed by   sodium chloride 0.9% infusion (has no administration in time range)       Assessments & Plan (with Medical Decision Making)     I have reviewed the nursing notes.    I have reviewed the findings, diagnosis, plan and need for follow up with the patient.      New Prescriptions    No medications on file       Final diagnoses:   Cellulitis of right lower leg   Hyponatremia       11/4/2022   [facility]     Denzel Rivera, DO  11/04/22 1334       Denzel Rivera, DO  11/04/22 8162

## 2022-11-04 NOTE — ED TRIAGE NOTES
Pt sent from [facility] [place], for evaluation r/o blood clot in right lower extremity. Pt reports increased swelling and redness to RLE that started a couple days ago. Painful to walk on.

## 2022-11-10 ENCOUNTER — APPOINTMENT (OUTPATIENT)
Dept: ULTRASOUND IMAGING | Facility: HOSPITAL | Age: 52
End: 2022-11-10
Attending: STUDENT IN AN ORGANIZED HEALTH CARE EDUCATION/TRAINING PROGRAM
Payer: COMMERCIAL

## 2022-11-10 ENCOUNTER — HOSPITAL ENCOUNTER (OUTPATIENT)
Facility: HOSPITAL | Age: 52
Setting detail: OBSERVATION
Discharge: HOME | End: 2022-11-12
Attending: STUDENT IN AN ORGANIZED HEALTH CARE EDUCATION/TRAINING PROGRAM | Admitting: INTERNAL MEDICINE
Payer: COMMERCIAL

## 2022-11-10 ENCOUNTER — APPOINTMENT (OUTPATIENT)
Dept: CT IMAGING | Facility: HOSPITAL | Age: 52
End: 2022-11-10
Attending: STUDENT IN AN ORGANIZED HEALTH CARE EDUCATION/TRAINING PROGRAM
Payer: COMMERCIAL

## 2022-11-10 DIAGNOSIS — L03.115 CELLULITIS OF RIGHT LOWER EXTREMITY: ICD-10-CM

## 2022-11-10 PROBLEM — K76.6 PORTAL HYPERTENSION (H): Status: ACTIVE | Noted: 2022-11-10

## 2022-11-10 PROBLEM — R60.0 LEG EDEMA, RIGHT: Status: ACTIVE | Noted: 2022-11-10

## 2022-11-10 PROBLEM — E87.1 HYPONATREMIA: Status: ACTIVE | Noted: 2022-11-10

## 2022-11-10 PROBLEM — F10.10 ALCOHOL ABUSE: Status: ACTIVE | Noted: 2022-11-10

## 2022-11-10 PROBLEM — D64.89 OTHER SPECIFIED ANEMIAS: Status: ACTIVE | Noted: 2022-11-10

## 2022-11-10 LAB
ALBUMIN SERPL BCG-MCNC: 2.4 G/DL (ref 3.5–5.2)
ALP SERPL-CCNC: 134 U/L (ref 40–129)
ALT SERPL W P-5'-P-CCNC: 35 U/L (ref 10–50)
ANION GAP SERPL CALCULATED.3IONS-SCNC: 6 MMOL/L (ref 7–15)
AST SERPL W P-5'-P-CCNC: 133 U/L (ref 10–50)
BASE EXCESS BLDV CALC-SCNC: 1.1 MMOL/L (ref -7.7–1.9)
BASOPHILS # BLD AUTO: 0 10E3/UL (ref 0–0.2)
BASOPHILS NFR BLD AUTO: 1 %
BILIRUB SERPL-MCNC: 5.2 MG/DL
BUN SERPL-MCNC: 8.7 MG/DL (ref 6–20)
CALCIUM SERPL-MCNC: 7.6 MG/DL (ref 8.6–10)
CHLORIDE SERPL-SCNC: 99 MMOL/L (ref 98–107)
CREAT SERPL-MCNC: 0.78 MG/DL (ref 0.67–1.17)
CRP SERPL-MCNC: 26.43 MG/L
DEPRECATED HCO3 PLAS-SCNC: 24 MMOL/L (ref 22–29)
EOSINOPHIL # BLD AUTO: 0.1 10E3/UL (ref 0–0.7)
EOSINOPHIL NFR BLD AUTO: 1 %
ERYTHROCYTE [DISTWIDTH] IN BLOOD BY AUTOMATED COUNT: 23.2 % (ref 10–15)
ERYTHROCYTE [SEDIMENTATION RATE] IN BLOOD BY WESTERGREN METHOD: 86 MM/HR (ref 0–20)
FLUAV RNA SPEC QL NAA+PROBE: NEGATIVE
FLUBV RNA RESP QL NAA+PROBE: NEGATIVE
GFR SERPL CREATININE-BSD FRML MDRD: >90 ML/MIN/1.73M2
GLUCOSE SERPL-MCNC: 142 MG/DL (ref 70–99)
HCO3 BLDV-SCNC: 25 MMOL/L (ref 21–28)
HCT VFR BLD AUTO: 28.5 % (ref 40–53)
HGB BLD-MCNC: 9.1 G/DL (ref 13.3–17.7)
HOLD SPECIMEN: NORMAL
IMM GRANULOCYTES # BLD: 0.1 10E3/UL
IMM GRANULOCYTES NFR BLD: 2 %
LACTATE SERPL-SCNC: 1.5 MMOL/L (ref 0.7–2)
LACTATE SERPL-SCNC: 2 MMOL/L (ref 0.7–2)
LYMPHOCYTES # BLD AUTO: 0.9 10E3/UL (ref 0.8–5.3)
LYMPHOCYTES NFR BLD AUTO: 15 %
MAGNESIUM SERPL-MCNC: 1.8 MG/DL (ref 1.7–2.3)
MCH RBC QN AUTO: 26.8 PG (ref 26.5–33)
MCHC RBC AUTO-ENTMCNC: 31.9 G/DL (ref 31.5–36.5)
MCV RBC AUTO: 84 FL (ref 78–100)
MONOCYTES # BLD AUTO: 0.5 10E3/UL (ref 0–1.3)
MONOCYTES NFR BLD AUTO: 8 %
NEUTROPHILS # BLD AUTO: 4.3 10E3/UL (ref 1.6–8.3)
NEUTROPHILS NFR BLD AUTO: 73 %
NRBC # BLD AUTO: 0 10E3/UL
NRBC BLD AUTO-RTO: 0 /100
O2/TOTAL GAS SETTING VFR VENT: 21 %
OXYHGB MFR BLDV: 70 % (ref 70–75)
PCO2 BLDV: 35 MM HG (ref 40–50)
PH BLDV: 7.46 [PH] (ref 7.32–7.43)
PLATELET # BLD AUTO: 65 10E3/UL (ref 150–450)
PO2 BLDV: 39 MM HG (ref 25–47)
POTASSIUM SERPL-SCNC: 4.7 MMOL/L (ref 3.4–5.3)
PROCALCITONIN SERPL IA-MCNC: 0.12 NG/ML
PROCALCITONIN SERPL IA-MCNC: 0.14 NG/ML
PROT SERPL-MCNC: 8.4 G/DL (ref 6.4–8.3)
RBC # BLD AUTO: 3.4 10E6/UL (ref 4.4–5.9)
RSV RNA SPEC NAA+PROBE: NEGATIVE
SARS-COV-2 RNA RESP QL NAA+PROBE: NEGATIVE
SODIUM SERPL-SCNC: 129 MMOL/L (ref 136–145)
WBC # BLD AUTO: 5.9 10E3/UL (ref 4–11)

## 2022-11-10 PROCEDURE — 99285 EMERGENCY DEPT VISIT HI MDM: CPT | Performed by: STUDENT IN AN ORGANIZED HEALTH CARE EDUCATION/TRAINING PROGRAM

## 2022-11-10 PROCEDURE — 85025 COMPLETE CBC W/AUTO DIFF WBC: CPT | Performed by: STUDENT IN AN ORGANIZED HEALTH CARE EDUCATION/TRAINING PROGRAM

## 2022-11-10 PROCEDURE — 36415 COLL VENOUS BLD VENIPUNCTURE: CPT | Performed by: STUDENT IN AN ORGANIZED HEALTH CARE EDUCATION/TRAINING PROGRAM

## 2022-11-10 PROCEDURE — 82805 BLOOD GASES W/O2 SATURATION: CPT | Performed by: STUDENT IN AN ORGANIZED HEALTH CARE EDUCATION/TRAINING PROGRAM

## 2022-11-10 PROCEDURE — 250N000013 HC RX MED GY IP 250 OP 250 PS 637: Performed by: INTERNAL MEDICINE

## 2022-11-10 PROCEDURE — 99219 PR INITIAL OBSERVATION CARE,LEVEL II: CPT | Mod: AI | Performed by: INTERNAL MEDICINE

## 2022-11-10 PROCEDURE — 36415 COLL VENOUS BLD VENIPUNCTURE: CPT | Performed by: INTERNAL MEDICINE

## 2022-11-10 PROCEDURE — 84145 PROCALCITONIN (PCT): CPT | Performed by: STUDENT IN AN ORGANIZED HEALTH CARE EDUCATION/TRAINING PROGRAM

## 2022-11-10 PROCEDURE — 250N000011 HC RX IP 250 OP 636: Performed by: STUDENT IN AN ORGANIZED HEALTH CARE EDUCATION/TRAINING PROGRAM

## 2022-11-10 PROCEDURE — 250N000011 HC RX IP 250 OP 636: Performed by: INTERNAL MEDICINE

## 2022-11-10 PROCEDURE — 120N000001 HC R&B MED SURG/OB

## 2022-11-10 PROCEDURE — 999N000157 HC STATISTIC RCP TIME EA 10 MIN

## 2022-11-10 PROCEDURE — 75635 CT ANGIO ABDOMINAL ARTERIES: CPT

## 2022-11-10 PROCEDURE — C9803 HOPD COVID-19 SPEC COLLECT: HCPCS

## 2022-11-10 PROCEDURE — 84145 PROCALCITONIN (PCT): CPT | Mod: 91 | Performed by: INTERNAL MEDICINE

## 2022-11-10 PROCEDURE — 87040 BLOOD CULTURE FOR BACTERIA: CPT | Performed by: STUDENT IN AN ORGANIZED HEALTH CARE EDUCATION/TRAINING PROGRAM

## 2022-11-10 PROCEDURE — 83605 ASSAY OF LACTIC ACID: CPT | Performed by: STUDENT IN AN ORGANIZED HEALTH CARE EDUCATION/TRAINING PROGRAM

## 2022-11-10 PROCEDURE — 87637 SARSCOV2&INF A&B&RSV AMP PRB: CPT | Performed by: STUDENT IN AN ORGANIZED HEALTH CARE EDUCATION/TRAINING PROGRAM

## 2022-11-10 PROCEDURE — 80053 COMPREHEN METABOLIC PANEL: CPT | Performed by: STUDENT IN AN ORGANIZED HEALTH CARE EDUCATION/TRAINING PROGRAM

## 2022-11-10 PROCEDURE — 96365 THER/PROPH/DIAG IV INF INIT: CPT

## 2022-11-10 PROCEDURE — 85652 RBC SED RATE AUTOMATED: CPT | Performed by: STUDENT IN AN ORGANIZED HEALTH CARE EDUCATION/TRAINING PROGRAM

## 2022-11-10 PROCEDURE — 258N000003 HC RX IP 258 OP 636: Performed by: STUDENT IN AN ORGANIZED HEALTH CARE EDUCATION/TRAINING PROGRAM

## 2022-11-10 PROCEDURE — 96375 TX/PRO/DX INJ NEW DRUG ADDON: CPT

## 2022-11-10 PROCEDURE — 96376 TX/PRO/DX INJ SAME DRUG ADON: CPT

## 2022-11-10 PROCEDURE — 96367 TX/PROPH/DG ADDL SEQ IV INF: CPT

## 2022-11-10 PROCEDURE — 83735 ASSAY OF MAGNESIUM: CPT | Performed by: INTERNAL MEDICINE

## 2022-11-10 PROCEDURE — 86140 C-REACTIVE PROTEIN: CPT | Performed by: STUDENT IN AN ORGANIZED HEALTH CARE EDUCATION/TRAINING PROGRAM

## 2022-11-10 PROCEDURE — 99285 EMERGENCY DEPT VISIT HI MDM: CPT | Mod: 25

## 2022-11-10 PROCEDURE — 76882 US LMTD JT/FCL EVL NVASC XTR: CPT | Mod: RT

## 2022-11-10 PROCEDURE — 83605 ASSAY OF LACTIC ACID: CPT | Performed by: INTERNAL MEDICINE

## 2022-11-10 RX ORDER — IOPAMIDOL 755 MG/ML
98 INJECTION, SOLUTION INTRAVASCULAR ONCE
Status: COMPLETED | OUTPATIENT
Start: 2022-11-10 | End: 2022-11-10

## 2022-11-10 RX ORDER — CYCLOBENZAPRINE HCL 10 MG
10 TABLET ORAL 3 TIMES DAILY PRN
COMMUNITY

## 2022-11-10 RX ORDER — CEFTRIAXONE SODIUM 2 G/50ML
2 INJECTION, SOLUTION INTRAVENOUS ONCE
Status: COMPLETED | OUTPATIENT
Start: 2022-11-10 | End: 2022-11-10

## 2022-11-10 RX ORDER — NALOXONE HYDROCHLORIDE 0.4 MG/ML
0.4 INJECTION, SOLUTION INTRAMUSCULAR; INTRAVENOUS; SUBCUTANEOUS
Status: DISCONTINUED | OUTPATIENT
Start: 2022-11-10 | End: 2022-11-12 | Stop reason: HOSPADM

## 2022-11-10 RX ORDER — ONDANSETRON 2 MG/ML
4 INJECTION INTRAMUSCULAR; INTRAVENOUS EVERY 6 HOURS PRN
Status: DISCONTINUED | OUTPATIENT
Start: 2022-11-10 | End: 2022-11-12 | Stop reason: HOSPADM

## 2022-11-10 RX ORDER — CYCLOBENZAPRINE HCL 10 MG
10 TABLET ORAL 3 TIMES DAILY PRN
Status: DISCONTINUED | OUTPATIENT
Start: 2022-11-10 | End: 2022-11-12 | Stop reason: HOSPADM

## 2022-11-10 RX ORDER — ACETAMINOPHEN 650 MG/1
650 SUPPOSITORY RECTAL EVERY 6 HOURS PRN
Status: DISCONTINUED | OUTPATIENT
Start: 2022-11-10 | End: 2022-11-12 | Stop reason: HOSPADM

## 2022-11-10 RX ORDER — HYDROMORPHONE HYDROCHLORIDE 1 MG/ML
0.5 INJECTION, SOLUTION INTRAMUSCULAR; INTRAVENOUS; SUBCUTANEOUS
Status: DISCONTINUED | OUTPATIENT
Start: 2022-11-10 | End: 2022-11-10

## 2022-11-10 RX ORDER — NALOXONE HYDROCHLORIDE 0.4 MG/ML
0.2 INJECTION, SOLUTION INTRAMUSCULAR; INTRAVENOUS; SUBCUTANEOUS
Status: DISCONTINUED | OUTPATIENT
Start: 2022-11-10 | End: 2022-11-12 | Stop reason: HOSPADM

## 2022-11-10 RX ORDER — SERTRALINE HYDROCHLORIDE 100 MG/1
100 TABLET, FILM COATED ORAL DAILY
Status: ON HOLD | COMMUNITY
End: 2022-11-12

## 2022-11-10 RX ORDER — PROPRANOLOL HYDROCHLORIDE 20 MG/1
20 TABLET ORAL 2 TIMES DAILY
Status: DISCONTINUED | OUTPATIENT
Start: 2022-11-10 | End: 2022-11-12 | Stop reason: HOSPADM

## 2022-11-10 RX ORDER — LIDOCAINE 40 MG/G
CREAM TOPICAL
Status: DISCONTINUED | OUTPATIENT
Start: 2022-11-10 | End: 2022-11-12 | Stop reason: HOSPADM

## 2022-11-10 RX ORDER — PROPRANOLOL HYDROCHLORIDE 20 MG/1
20 TABLET ORAL 2 TIMES DAILY
COMMUNITY
Start: 2022-08-09

## 2022-11-10 RX ORDER — KETOROLAC TROMETHAMINE 30 MG/ML
30 INJECTION, SOLUTION INTRAMUSCULAR; INTRAVENOUS EVERY 6 HOURS PRN
Status: DISCONTINUED | OUTPATIENT
Start: 2022-11-10 | End: 2022-11-12 | Stop reason: HOSPADM

## 2022-11-10 RX ORDER — CEFTRIAXONE SODIUM 1 G/50ML
1 INJECTION, SOLUTION INTRAVENOUS EVERY 24 HOURS
Status: DISCONTINUED | OUTPATIENT
Start: 2022-11-11 | End: 2022-11-12 | Stop reason: HOSPADM

## 2022-11-10 RX ORDER — HYDROCODONE BITARTRATE AND ACETAMINOPHEN 5; 325 MG/1; MG/1
1 TABLET ORAL EVERY 6 HOURS PRN
COMMUNITY
End: 2022-11-12

## 2022-11-10 RX ORDER — ACETAMINOPHEN 325 MG/1
650 TABLET ORAL EVERY 6 HOURS PRN
Status: DISCONTINUED | OUTPATIENT
Start: 2022-11-10 | End: 2022-11-12 | Stop reason: HOSPADM

## 2022-11-10 RX ORDER — HYDROCODONE BITARTRATE AND ACETAMINOPHEN 5; 325 MG/1; MG/1
1 TABLET ORAL EVERY 6 HOURS PRN
Status: DISCONTINUED | OUTPATIENT
Start: 2022-11-10 | End: 2022-11-12 | Stop reason: HOSPADM

## 2022-11-10 RX ORDER — GABAPENTIN 100 MG/1
100 CAPSULE ORAL 3 TIMES DAILY
COMMUNITY
Start: 2022-05-12

## 2022-11-10 RX ORDER — FUROSEMIDE 40 MG
40 TABLET ORAL DAILY
Status: DISCONTINUED | OUTPATIENT
Start: 2022-11-10 | End: 2022-11-12 | Stop reason: HOSPADM

## 2022-11-10 RX ORDER — ONDANSETRON 4 MG/1
4 TABLET, ORALLY DISINTEGRATING ORAL EVERY 6 HOURS PRN
Status: DISCONTINUED | OUTPATIENT
Start: 2022-11-10 | End: 2022-11-12 | Stop reason: HOSPADM

## 2022-11-10 RX ORDER — PANTOPRAZOLE SODIUM 40 MG/1
40 TABLET, DELAYED RELEASE ORAL DAILY
Status: DISCONTINUED | OUTPATIENT
Start: 2022-11-11 | End: 2022-11-12 | Stop reason: HOSPADM

## 2022-11-10 RX ORDER — GABAPENTIN 100 MG/1
100 CAPSULE ORAL 3 TIMES DAILY
Status: DISCONTINUED | OUTPATIENT
Start: 2022-11-10 | End: 2022-11-12 | Stop reason: HOSPADM

## 2022-11-10 RX ADMIN — IOPAMIDOL 98 ML: 755 INJECTION, SOLUTION INTRAVENOUS at 19:05

## 2022-11-10 RX ADMIN — CEFTRIAXONE SODIUM 2 G: 2 INJECTION, SOLUTION INTRAVENOUS at 13:41

## 2022-11-10 RX ADMIN — PROPRANOLOL HYDROCHLORIDE 20 MG: 20 TABLET ORAL at 22:38

## 2022-11-10 RX ADMIN — FUROSEMIDE 40 MG: 40 TABLET ORAL at 22:38

## 2022-11-10 RX ADMIN — ACETAMINOPHEN 650 MG: 325 TABLET ORAL at 22:38

## 2022-11-10 RX ADMIN — HYDROMORPHONE HYDROCHLORIDE 0.5 MG: 1 INJECTION, SOLUTION INTRAMUSCULAR; INTRAVENOUS; SUBCUTANEOUS at 13:40

## 2022-11-10 RX ADMIN — HYDROMORPHONE HYDROCHLORIDE 0.5 MG: 1 INJECTION, SOLUTION INTRAMUSCULAR; INTRAVENOUS; SUBCUTANEOUS at 18:16

## 2022-11-10 RX ADMIN — KETOROLAC TROMETHAMINE 30 MG: 30 INJECTION, SOLUTION INTRAMUSCULAR; INTRAVENOUS at 20:21

## 2022-11-10 RX ADMIN — GABAPENTIN 100 MG: 100 CAPSULE ORAL at 22:38

## 2022-11-10 RX ADMIN — VANCOMYCIN HYDROCHLORIDE 1500 MG: 5 INJECTION, POWDER, LYOPHILIZED, FOR SOLUTION INTRAVENOUS at 14:15

## 2022-11-10 ASSESSMENT — ACTIVITIES OF DAILY LIVING (ADL)
ADLS_ACUITY_SCORE: 35
ADLS_ACUITY_SCORE: 35
WEAR_GLASSES_OR_BLIND: YES
DIFFICULTY_EATING/SWALLOWING: NO
CHANGE_IN_FUNCTIONAL_STATUS_SINCE_ONSET_OF_CURRENT_ILLNESS/INJURY: NO
ADLS_ACUITY_SCORE: 35
FALL_HISTORY_WITHIN_LAST_SIX_MONTHS: NO
ADLS_ACUITY_SCORE: 20
WALKING_OR_CLIMBING_STAIRS_DIFFICULTY: NO
ADLS_ACUITY_SCORE: 35
DRESSING/BATHING_DIFFICULTY: NO
CONCENTRATING,_REMEMBERING_OR_MAKING_DECISIONS_DIFFICULTY: NO
DOING_ERRANDS_INDEPENDENTLY_DIFFICULTY: NO
ADLS_ACUITY_SCORE: 35
VISION_MANAGEMENT: READING GLASSES
TOILETING_ISSUES: NO

## 2022-11-10 NOTE — PHARMACY-VANCOMYCIN DOSING SERVICE
Pharmacy received consult to dose vancomycin for ED for one time dosing. Indication(s): SSTI. Dosed at 17 mg/kg using weight of 86.2 kg which gave a calculated dose of 1500 mg. Please order another pharmacy to dose vancomycin consult if admitted and vancomycin is needed. Thank you.    Ilsa Fuentes, PharmD on 11/10/22       Universal Safety Interventions

## 2022-11-10 NOTE — ED PROVIDER NOTES
History     Chief Complaint   Patient presents with     Wound Infection     HPI  Marlon Guy is a 52 year old male with a history of alcoholism presents to the emergency department today with concerns for cellulitis.  He was seen on the fourth of this month, about 1 week ago for a cellulitis, was discharged with antibiotics, clindamycin which she has been taking appropriately, despite that he has had no improvement in his symptoms but is only noticed worsening swelling pain, and now he has a worsening discoloration over the extremity.  He is also noticed some desquamation of the skin and ongoing weeping from the wounds.  He does not think he has been having fevers.  He has not have any other complaints at this time.  He did have an ultrasound at that time which did not find DVT    Allergies:  Allergies   Allergen Reactions     Amoxicillin Rash       Problem List:    Patient Active Problem List    Diagnosis Date Noted     Cellulitis of right lower extremity 11/10/2022     Priority: Medium     Acquired trigger finger 06/16/2020     Priority: Medium     Jun 22, 2017 Entered By: ALESSANDRA ALDRICH Comment: left handed       Anxiety 06/16/2020     Priority: Medium     Erectile dysfunction 06/16/2020     Priority: Medium     Insomnia 06/16/2020     Priority: Medium     Moderate alcohol dependence (H) 06/16/2020     Priority: Medium     Obesity 06/16/2020     Priority: Medium     Tinnitus 06/16/2020     Priority: Medium     Hyphema, right eye 06/07/2017     Priority: Medium     Alcoholic fatty liver 12/14/2012     Priority: Medium     Abnormal LFTs 12/13/2012     Priority: Medium     Acute alcoholic pancreatitis 12/12/2012     Priority: Medium     Pulmonary nodule 12/12/2012     Priority: Medium     5 mm possible nodule, right middle lobe seen on CT abd done for   pancreatitis       Cyst of skin 05/31/2012     Priority: Medium     Internal hemorrhoids 01/19/2012     Priority: Medium     Obstructive sleep apnea  "07/09/2010     Priority: Medium     Setting: CPAP 12 cmH20 Transcend   Supplied by: [facility]  PSG done:  8-5-10  AHI  16  RDI  23  Lowest O2 Sat:  86%  ; Moderate Obstructive sleep apnea       Esophageal reflux 03/27/2008     Priority: Medium     EGD findings 5/30/2008:   Impression: - LA Grade C reflux esophagitis.   - Gaping lower esophageal sphincter.   - Non-bleeding erosive gastropathy.   - Gastric mucosal abnormality   characterized by erythema.   Recommendation: - Follow an antireflux regimen   indefinitely.   - Use Prilosec (omeprazole) at 20 mg PO   BID indefinitely.  ; Gastroesophageal Reflux Disease       Rosacea 03/27/2008     Priority: Medium     Rosacea          Past Medical History:    Past Medical History:   Diagnosis Date     Anemia        Past Surgical History:    Past Surgical History:   Procedure Laterality Date     ORTHOPEDIC SURGERY         Family History:    No family history on file.    Social History:  Marital Status:   [2]  Social History     Tobacco Use     Smoking status: Former     Types: Cigarettes     Smokeless tobacco: Current     Types: Chew   Substance Use Topics     Alcohol use: Yes     Alcohol/week: 50.0 standard drinks     Types: 50 Cans of beer per week     Comment: daily     Drug use: Never        Medications:    clindamycin (CLEOCIN) 300 MG capsule  cyclobenzaprine (FLEXERIL) 10 MG tablet  gabapentin (NEURONTIN) 100 MG capsule  HYDROcodone-acetaminophen (NORCO) 5-325 MG tablet  pantoprazole (PROTONIX) 40 MG EC tablet  propranolol (INDERAL) 20 MG tablet  sertraline (ZOLOFT) 100 MG tablet          Review of Systems  A complete review of systems was performed and is otherwise negative.     Physical Exam   BP: 111/66  Pulse: 73  Temp: 97.6  F (36.4  C)  Resp: 16  Height: 165.1 cm (5' 5\")  Weight: 86.2 kg (190 lb)  SpO2: 97 %      Physical Exam  Constitutional: Alert and conversant. NAD   HENT: NCAT   Eyes: Normal pupils   Neck: supple   CV: Normal rate, regular rhythm, no " murmur   Pulmonary/Chest: Non-labored respirations, clear to auscultation bilaterally   Abdominal: Soft, non-tender, non-distended   MSK: LEMOS.   Neuro: Alert and appropriate   Skin: Warm and dry. No diaphoresis.       Psych: Appropriate mood and affect     ED Course              ED Course as of 11/10/22 1728   Thu Nov 10, 2022   1320 52M with LE cellulitis and swelling with exam consistent with cellulitis.  Ultrasound done when this started did not find DVT.  He has a elevated procalcitonin and CRP in elevated sed rate, this is consistent with an infectious etiology, and doubt DVT at this time.  This appears to be a failure of outpatient antibiotics.  I did consider necrotizing soft tissue infection, however, this is not progressed in the way that I would expect in an STI to progress, he is afebrile and does not look systemically ill nor does he have pain out of proportion.  Compartments are soft.  Vancomycin and ceftriaxone ordered.  Dilaudid ordered for pain.  Ultrasound plan for further evaluation of the leg to look for possible abscesses   1518 US Lower Extremity Non Vascular Right  No abscesses   1518 Medicine paged   172 Patient accepted to the medicine service admission orders placed     Procedures         Results for orders placed or performed during the hospital encounter of 11/10/22 (from the past 24 hour(s))   [place] Draw    Narrative    The following orders were created for panel order [place] Draw.  Procedure                               Abnormality         Status                     ---------                               -----------         ------                     Extra Blood Culture Bottle[[id number]]                       Final result               Extra Blue Top Tube[[id number]]                              Final result               Extra Red Top Tube[[id number]]                               Final result               Extra Green Top (Lithium...[[id number]]                      Final result                Extra Purple Top Tube[[id number]]                            Final result               Extra Green Top (Lithium...[[id number]]                      Final result                 Please view results for these tests on the individual orders.   Extra Blood Culture Bottle   Result Value Ref Range    Hold Specimen JIC    Extra Blue Top Tube   Result Value Ref Range    Hold Specimen JIC    Extra Red Top Tube   Result Value Ref Range    Hold Specimen JIC    Extra Green Top (Lithium Heparin) Tube   Result Value Ref Range    Hold Specimen JIC    Extra Purple Top Tube   Result Value Ref Range    Hold Specimen JIC    Extra Green Top (Lithium Heparin) ON ICE   Result Value Ref Range    Hold Specimen JIC    CBC with platelets differential    Narrative    The following orders were created for panel order CBC with platelets differential.  Procedure                               Abnormality         Status                     ---------                               -----------         ------                     CBC with platelets and d...[[id number]]  Abnormal            Final result                 Please view results for these tests on the individual orders.   Comprehensive metabolic panel   Result Value Ref Range    Sodium 129 (L) 136 - 145 mmol/L    Potassium 4.7 3.4 - 5.3 mmol/L    Chloride 99 98 - 107 mmol/L    Carbon Dioxide (CO2) 24 22 - 29 mmol/L    Anion Gap 6 (L) 7 - 15 mmol/L    Urea Nitrogen 8.7 6.0 - 20.0 mg/dL    Creatinine 0.78 0.67 - 1.17 mg/dL    Calcium 7.6 (L) 8.6 - 10.0 mg/dL    Glucose 142 (H) 70 - 99 mg/dL    Alkaline Phosphatase 134 (H) 40 - 129 U/L     (H) 10 - 50 U/L    ALT 35 10 - 50 U/L    Protein Total 8.4 (H) 6.4 - 8.3 g/dL    Albumin 2.4 (L) 3.5 - 5.2 g/dL    Bilirubin Total 5.2 (H) <=1.2 mg/dL    GFR Estimate >90 >60 mL/min/1.73m2   Lactic acid whole blood   Result Value Ref Range    Lactic Acid 2.0 0.7 - 2.0 mmol/L   Procalcitonin   Result Value Ref Range    Procalcitonin 0.12 (H) <0.05  ng/mL   CBC with platelets and differential   Result Value Ref Range    WBC Count 5.9 4.0 - 11.0 10e3/uL    RBC Count 3.40 (L) 4.40 - 5.90 10e6/uL    Hemoglobin 9.1 (L) 13.3 - 17.7 g/dL    Hematocrit 28.5 (L) 40.0 - 53.0 %    MCV 84 78 - 100 fL    MCH 26.8 26.5 - 33.0 pg    MCHC 31.9 31.5 - 36.5 g/dL    RDW 23.2 (H) 10.0 - 15.0 %    Platelet Count 65 (L) 150 - 450 10e3/uL    % Neutrophils 73 %    % Lymphocytes 15 %    % Monocytes 8 %    % Eosinophils 1 %    % Basophils 1 %    % Immature Granulocytes 2 %    NRBCs per 100 WBC 0 <1 /100    Absolute Neutrophils 4.3 1.6 - 8.3 10e3/uL    Absolute Lymphocytes 0.9 0.8 - 5.3 10e3/uL    Absolute Monocytes 0.5 0.0 - 1.3 10e3/uL    Absolute Eosinophils 0.1 0.0 - 0.7 10e3/uL    Absolute Basophils 0.0 0.0 - 0.2 10e3/uL    Absolute Immature Granulocytes 0.1 <=0.4 10e3/uL    Absolute NRBCs 0.0 10e3/uL   CRP inflammation   Result Value Ref Range    CRP Inflammation 26.43 (H) <5.00 mg/L   [place] Draw    Narrative    The following orders were created for panel order [place] Draw.  Procedure                               Abnormality         Status                     ---------                               -----------         ------                     Extra Blood Culture Bottle[[id number]]                       Final result                 Please view results for these tests on the individual orders.   Extra Blood Culture Bottle   Result Value Ref Range    Hold Specimen JIC    Erythrocyte sedimentation rate auto   Result Value Ref Range    Erythrocyte Sedimentation Rate 86 (H) 0 - 20 mm/hr   Blood gas venous and oxyhgb   Result Value Ref Range    pH Venous 7.46 (H) 7.32 - 7.43    pCO2 Venous 35 (L) 40 - 50 mm Hg    pO2 Venous 39 25 - 47 mm Hg    Bicarbonate Venous 25 21 - 28 mmol/L    FIO2 21     Oxyhemoglobin Venous 70 70 - 75 %    Base Excess/Deficit (+/-) 1.1 -7.7 - 1.9 mmol/L   Asymptomatic Influenza A/B & SARS-CoV2 (COVID-19) Virus PCR Multiplex Nasopharyngeal    Specimen:  Nasopharyngeal; Swab   Result Value Ref Range    Influenza A PCR Negative Negative    Influenza B PCR Negative Negative    RSV PCR Negative Negative    SARS CoV2 PCR Negative Negative    Narrative    Testing was performed using the Xpert Xpress CoV2/Flu/RSV Assay on the [employer] Gene[employer]pert Instrument. This test should be ordered for the detection of SARS-CoV-2 and influenza viruses in individuals who meet clinical and/or epidemiological criteria. Test performance is unknown in asymptomatic patients. This test is for in vitro diagnostic use under the FDA EUA for laboratories certified under CLIA to perform high or moderate complexity testing. This test has not been FDA cleared or approved. A negative result does not rule out the presence of PCR inhibitors in the specimen or target RNA in concentration below the limit of detection for the assay. If only one viral target is positive but coinfection with multiple targets is suspected, the sample should be re-tested with another FDA cleared, approved, or authorized test, if coinfection would change clinical management. This test was validated by the [facility]. These laboratories are certified under the Clinical Laboratory Improvement Amendments of 1988 (CLIA-88) as qualified to perform high complexity laboratory testing.   US Lower Extremity Non Vascular Right    Narrative    US LOWER EXTREMITY NON VASCULAR RIGHT    HISTORY: Right lower extremity swelling.    COMPARISON: Venous ultrasound 11/4/2022.    TECHNIQUE: Ultrasound in the area of clinical concern within the right  calf.    FINDINGS:    Extensive subcutaneous edema and skin thickening is seen. No  well-defined fluid collection is identified.        Impression    IMPRESSION:     Extensive subcutaneous edema.      ROYAL SIMMS MD         SYSTEM ID:  [id number]       Medications   HYDROmorphone (PF) (DILAUDID) injection 0.5 mg (0.5 mg Intravenous Given 11/10/22 1340)   vancomycin (VANCOCIN)  1,500 mg in 0.9% NaCl 250 mL intermittent infusion (0 mg Intravenous Stopped 11/10/22 1552)   cefTRIAXone IN D5W (ROCEPHIN) intermittent infusion 2 g (0 g Intravenous Stopped 11/10/22 1413)       Assessments & Plan (with Medical Decision Making)     I have reviewed the nursing notes.    I have reviewed the findings, diagnosis, plan and need for follow up with the patient.      New Prescriptions    No medications on file       Final diagnoses:   Cellulitis of right lower extremity       11/10/2022   [facility]     Aman Mohan MD  11/10/22 [id number]

## 2022-11-10 NOTE — ED TRIAGE NOTES
Pt presents to ED with significant swelling, redness, and breakdown in right leg. Pt has h/o liver disease. Pt's infection has spread from foot to above knee.    Jannet Harris RN on 11/10/2022 at 12:33 PM       Triage Assessment     Row Name 11/10/22 1231       Triage Assessment (Adult)    Airway WDL WDL       Skin Circulation/Temperature WDL    Skin Circulation/Temperature WDL X  significant swelling and breakdown in right leg       Cognitive/Neuro/Behavioral WDL    Cognitive/Neuro/Behavioral WDL WDL              
same name as above

## 2022-11-10 NOTE — CARE PLAN
Prior to Admission Medication Reconciliation preparation:   (medlist prepared for review. Have not spoken to patient)    Medications added:   [] None  [] Cleared entire med list from historical, outdated medications and re-entered current medications  [x] As listed below:    [facility]    S[facility]raline 100 mg daily [facility]    Propranolol 20 mg BID [facility]    Flexeril 10 mg TID PRN- prescribed  (therapy may be completed, confirm with pt)     norco- prescribed , 18 tabs (therapy may be completed, confirm with pt)    Medications deleted:   [] None  [] Cleared entire med list from historical, outdated medications   [x] As listed below:    Ferrous sulfate, ascorbic acid, folic aci- [facility]. Confirm with pt if admitted.     prozac- historical? Pt on sertraline.     [facility]-  [facility] . Confirm with pt if admitted.     Changes made to existing medications:   [x] None  [] Updated time of day, strengths and frequencies to most current.     Notes/pertinent information about medications:     Double check with patient on norco, flexeril, supplements and sucralfate.     Allergy Review:  [x]Allergies reviewed and updated if necessary.       Medication reconciliation sources:   []Patient via phone conversation.   []Medlist from primary provider outside of [facility]:  [x][facility] Report Review:   [x]Epic Chart Review  [x]Care Everywhere review:  Medication Details Status Last Dispense Date   ASCORBIC ACID 500MG TAB TAKE TWO TABLETS BY MOUTH EVERY DAY (VITAMIN C). TAKE WITH FERROUS SULFATE PILLS FOR BETTER ABSORPTION. CALL PCP FOR REFILLS DISCONTINUED 2022   FERROUS SO4 324MG TAB,EC TAKE ONE TABLET BY MOUTH EVERY DAY (IRON SUPPLEMENT) FOR IRON DEFICIENCY ANEMIA CALL PCP FOR REFILLS (IRON SUPPLEMENT) FOR IRON DEFICIENCY ANEMIA CALL PCP FOR REFILLS DISCONTINUED 2022   FOLIC ACID 1MG TAB TAKE ONE TABLET BY MOUTH EVERY DAY *VITAMIN SUPPLEMENT* DISCONTINUED 2022    [x]GABAPENTIN 100MG CAP TAKE ONE CAPSULE BY MOUTH THREE TIMES A DAY FOR PAIN /ANXIETY ACTIVE 2022   LACTULOSE 10GM/15ML SOLN,ORAL TAKE 15ML BY MOUTH EVERY 12 HOURS (HISTORY OF HEPATIC ENCEPHALOPATHY)  2022   MULTIVITAMINS CAP/TAB TAKE 1 CAPSULE/TABLET BY MOUTH EVERY DAY (VITAMIN)  2021   []OMEPRAZOLE 20MG CAP,EC TAKE 1 CAPSULE BY MOUTH EVERY DAY ACTIVE  ?   [x]PANTOPRAZOLE NA 40MG TAB,EC TAKE ONE TABLET BY MOUTH EVERY DAY FOR STOMACH ACID. TAKE IT 30 MINUTES BEFORE BREAKFAST. CALL PCP FOR REFILLS. ACTIVE 2022   [x]PROPRANOLOL HCL 20MG TAB TAKE ONE TABLET BY MOUTH TWICE DAILY FOR BLOOD PRESSURE ACTIVE 2022   [x]SERTRALINE HCL 100MG TAB TAKE ONE TABLET BY MOUTH EVERY DAY ACTIVE     SUCRALFATE 1GM TAB TAKE ONE TABLET BY MOUTH THREE TIMES A DAY BEFORE MEALS FOR STOMACH ACID. CALL PCP FOR REFILLS.  2021   THIAMINE 100MG TAB TAKE ONE TABLET BY MOUTH EVERY DAY FOR VITAMIN B1 SUPPLEMENT DISCONTINUED 2022   VENLAFAXINE HCL 37.5MG 24HR CAP,SA TAKE ONE CAPSULE BY MOUTH EVERY DAY FOR 7 DAYS, THEN TAKE TWO CAPSULES EVERY DAY FOR MOOD AND/OR PAIN  2022     []Pharmacy phone call:   [x]Outside meds dispense report:   [x]Fill dates reflect compliancy. No concerns.  []Fill dates do not reflect compliancy on the following medications:   []Behavioral Health Provider:   []Nursing home or Assisted Living MAR:  []Other: **    Is patient on coumadin?  [x]No  []Yes:       Pharmacy patient regularly uses: [facility]/ [facility]    Approximate time frame PTA medications will be reviewed with patient or managing party:    [x]PTA meds updated and ready to review with patient. Pt is likely to admit. Medications will be reviewed with patient by scribe if patient is admitted or boarded to the ED.       Trish Betancourt on 11/10/2022 at 1:31 PM       Medication Dispense History (from 11/10/2021 to 11/10/2022)  Expand All  Collapse All  Clindamycin HCl   Dispensed Days Supply Quantity  Provider Pharmacy   CLINDAMYCIN 300MG   CAP 11/04/2022 10 40 Units Denzel Rivera, DO [id number] ...      Cyclobenzaprine HCl   Dispensed Days Supply Quantity Provider Pharmacy   CYCLOBENZAPR 10MG   TAB 11/01/2022 7 20 Units Aster Perry CNP [id number] ...      HYDROcodone-Acetaminophen   Dispensed Days Supply Quantity Provider Pharmacy   HYDROCOD/ACETAM 5-325MG TAB 11/04/2022 5 18 Units Denzel Rivera, DO [id number] ...   HYDROCOD/ACETAM 5-325MG TAB 10/31/2022 3 12 Units Aroldo Song, PA [id number] ...        Disclaimer    Certain dispenses may not be available or accurate in this report, including over-the-counter medications, low cost prescriptions, prescriptions paid for by the patient or non-participating sources, or errors in insurance claims information. The provider should independently verify medication history with the patient.  External Sources

## 2022-11-10 NOTE — ED NOTES
Patient presents to emergency room with c/o right lower leg redness, swelling, and pain. The RLE swelling started a week ago. No redness at the time the swelling started to RLE. Started on Clindamycin last Thursday. RLE swelling and redness worsening. Denies fever or chills. Pulses present to RLE. Photo of RLE placed in chart. Skin sloughing to RLE. RLE weeping.

## 2022-11-11 LAB
ALBUMIN SERPL BCG-MCNC: 1.9 G/DL (ref 3.5–5.2)
ALP SERPL-CCNC: 128 U/L (ref 40–129)
ALT SERPL W P-5'-P-CCNC: 28 U/L (ref 10–50)
ANION GAP SERPL CALCULATED.3IONS-SCNC: 4 MMOL/L (ref 7–15)
AST SERPL W P-5'-P-CCNC: 78 U/L (ref 10–50)
BASOPHILS # BLD AUTO: 0 10E3/UL (ref 0–0.2)
BASOPHILS NFR BLD AUTO: 1 %
BILIRUB SERPL-MCNC: 4.5 MG/DL
BUN SERPL-MCNC: 12 MG/DL (ref 6–20)
CALCIUM SERPL-MCNC: 7.4 MG/DL (ref 8.6–10)
CHLORIDE SERPL-SCNC: 101 MMOL/L (ref 98–107)
CREAT SERPL-MCNC: 0.9 MG/DL (ref 0.67–1.17)
CRP SERPL-MCNC: 21.73 MG/L
DEPRECATED HCO3 PLAS-SCNC: 25 MMOL/L (ref 22–29)
EOSINOPHIL # BLD AUTO: 0.1 10E3/UL (ref 0–0.7)
EOSINOPHIL NFR BLD AUTO: 3 %
ERYTHROCYTE [DISTWIDTH] IN BLOOD BY AUTOMATED COUNT: 23.5 % (ref 10–15)
GFR SERPL CREATININE-BSD FRML MDRD: >90 ML/MIN/1.73M2
GLUCOSE SERPL-MCNC: 103 MG/DL (ref 70–99)
HCT VFR BLD AUTO: 26.8 % (ref 40–53)
HGB BLD-MCNC: 8.4 G/DL (ref 13.3–17.7)
IMM GRANULOCYTES # BLD: 0.1 10E3/UL
IMM GRANULOCYTES NFR BLD: 1 %
LYMPHOCYTES # BLD AUTO: 0.9 10E3/UL (ref 0.8–5.3)
LYMPHOCYTES NFR BLD AUTO: 17 %
MCH RBC QN AUTO: 26.5 PG (ref 26.5–33)
MCHC RBC AUTO-ENTMCNC: 31.3 G/DL (ref 31.5–36.5)
MCV RBC AUTO: 85 FL (ref 78–100)
MONOCYTES # BLD AUTO: 0.5 10E3/UL (ref 0–1.3)
MONOCYTES NFR BLD AUTO: 10 %
NEUTROPHILS # BLD AUTO: 3.6 10E3/UL (ref 1.6–8.3)
NEUTROPHILS NFR BLD AUTO: 68 %
NRBC # BLD AUTO: 0 10E3/UL
NRBC BLD AUTO-RTO: 0 /100
PLATELET # BLD AUTO: 85 10E3/UL (ref 150–450)
POTASSIUM SERPL-SCNC: 4.1 MMOL/L (ref 3.4–5.3)
PROT SERPL-MCNC: 7.1 G/DL (ref 6.4–8.3)
RBC # BLD AUTO: 3.17 10E6/UL (ref 4.4–5.9)
SODIUM SERPL-SCNC: 130 MMOL/L (ref 136–145)
WBC # BLD AUTO: 5.3 10E3/UL (ref 4–11)

## 2022-11-11 PROCEDURE — 250N000013 HC RX MED GY IP 250 OP 250 PS 637: Performed by: INTERNAL MEDICINE

## 2022-11-11 PROCEDURE — 36415 COLL VENOUS BLD VENIPUNCTURE: CPT | Performed by: INTERNAL MEDICINE

## 2022-11-11 PROCEDURE — 99225 PR SUBSEQUENT OBSERVATION CARE,LEVEL II: CPT | Performed by: INTERNAL MEDICINE

## 2022-11-11 PROCEDURE — 80053 COMPREHEN METABOLIC PANEL: CPT | Performed by: INTERNAL MEDICINE

## 2022-11-11 PROCEDURE — 96376 TX/PRO/DX INJ SAME DRUG ADON: CPT

## 2022-11-11 PROCEDURE — 85014 HEMATOCRIT: CPT | Performed by: INTERNAL MEDICINE

## 2022-11-11 PROCEDURE — 258N000003 HC RX IP 258 OP 636: Performed by: INTERNAL MEDICINE

## 2022-11-11 PROCEDURE — 250N000011 HC RX IP 250 OP 636: Performed by: INTERNAL MEDICINE

## 2022-11-11 PROCEDURE — G0378 HOSPITAL OBSERVATION PER HR: HCPCS

## 2022-11-11 PROCEDURE — 86140 C-REACTIVE PROTEIN: CPT | Performed by: INTERNAL MEDICINE

## 2022-11-11 RX ORDER — VENLAFAXINE HYDROCHLORIDE 37.5 MG/1
CAPSULE, EXTENDED RELEASE ORAL
COMMUNITY

## 2022-11-11 RX ORDER — LANOLIN ALCOHOL/MO/W.PET/CERES
100 CREAM (GRAM) TOPICAL EVERY OTHER DAY
COMMUNITY

## 2022-11-11 RX ORDER — FOLIC ACID 1 MG/1
1 TABLET ORAL DAILY
COMMUNITY

## 2022-11-11 RX ORDER — ASCORBIC ACID 500 MG
1000 TABLET ORAL EVERY OTHER DAY
COMMUNITY

## 2022-11-11 RX ORDER — FERROUS GLUCONATE 324(38)MG
324 TABLET ORAL DAILY
COMMUNITY

## 2022-11-11 RX ORDER — SUCRALFATE 1 G/1
1 TABLET ORAL
COMMUNITY

## 2022-11-11 RX ADMIN — HYDROCODONE BITARTRATE AND ACETAMINOPHEN 1 TABLET: 5; 325 TABLET ORAL at 19:36

## 2022-11-11 RX ADMIN — GABAPENTIN 100 MG: 100 CAPSULE ORAL at 13:50

## 2022-11-11 RX ADMIN — CEPHALEXIN 500 MG: 250 CAPSULE ORAL at 06:04

## 2022-11-11 RX ADMIN — PANTOPRAZOLE SODIUM 40 MG: 40 TABLET, DELAYED RELEASE ORAL at 08:42

## 2022-11-11 RX ADMIN — CEPHALEXIN 500 MG: 250 CAPSULE ORAL at 12:58

## 2022-11-11 RX ADMIN — CEFTRIAXONE SODIUM 1 G: 1 INJECTION, SOLUTION INTRAVENOUS at 12:58

## 2022-11-11 RX ADMIN — GABAPENTIN 100 MG: 100 CAPSULE ORAL at 08:42

## 2022-11-11 RX ADMIN — VANCOMYCIN HYDROCHLORIDE 1750 MG: 5 INJECTION, POWDER, LYOPHILIZED, FOR SOLUTION INTRAVENOUS at 13:51

## 2022-11-11 RX ADMIN — HYDROCODONE BITARTRATE AND ACETAMINOPHEN 1 TABLET: 5; 325 TABLET ORAL at 08:53

## 2022-11-11 RX ADMIN — FUROSEMIDE 40 MG: 40 TABLET ORAL at 08:55

## 2022-11-11 RX ADMIN — CYCLOBENZAPRINE HYDROCHLORIDE 10 MG: 10 TABLET, FILM COATED ORAL at 06:04

## 2022-11-11 RX ADMIN — KETOROLAC TROMETHAMINE 30 MG: 30 INJECTION, SOLUTION INTRAMUSCULAR; INTRAVENOUS at 16:24

## 2022-11-11 RX ADMIN — GABAPENTIN 100 MG: 100 CAPSULE ORAL at 19:36

## 2022-11-11 RX ADMIN — KETOROLAC TROMETHAMINE 30 MG: 30 INJECTION, SOLUTION INTRAMUSCULAR; INTRAVENOUS at 06:04

## 2022-11-11 ASSESSMENT — ACTIVITIES OF DAILY LIVING (ADL)
ADLS_ACUITY_SCORE: 21

## 2022-11-11 NOTE — PROGRESS NOTES
Medical record and Yung Score reviewed. Participated in interdisciplinary rounds.  No apparent needs noted at this time. Care Transitions will remain available if needs arise.

## 2022-11-11 NOTE — PROGRESS NOTES
"[facility]  Hospitalist Progress Note          Assessment and Plan:     Assessment & Plan     Marlon Guy is a 52 year old male admitted on 11/10/2022. He he was diagnosed with RLE cellulitis 11.4.2022, treated with clindamycin, he is back to ED with RLE edema, redness, pain. He is afebrile, inflammatory markers improved since treatment starting, but edema, redness persist or even worse. No fever, and WBC normal (was 17.4 11.4.2022, which allows me to believe that clindamycin is \"working' but edema is due to portal HTN and possibly liver cirrhosis.   History of previous fracture of RLE and ankle but denies any chronic LE edema.       Hospital problems:   1. Cellulitis RLE  - Possibly some improvement  - continue Rocephin IV  - f/u cultures  - CRP 21.73 today     2. Portal hypertension  - most likely developing liver cirrhosis per CT     3. Abnormal liver function tests  - due to alcohol abuse, will follow  - [facility] and [facility]     4. MARY  - uses CPAP  - consult RT     5. RLE edema  - no DVT x 2 (11.4 and today)  - start lasix 40 mg po daily x 4 days  - elevate the leg     6. Hyponatremia  - dilutional due to liver disease     7. Anemia and thrombocytopenia  - normocytic  - monitor  - no clinical signs of bleeding     8. Low albumin  - multifactorial  - liver dz and nutritional  - may need dietary consult.         Diet: Regular Diet Adult    DVT Prophylaxis: Pneumatic Compression Devices to the left leg  Rivera Catheter: Not present  Central Lines: None  Cardiac Monitoring: None  Code Status:   full code       Clinically Significant Risk Factors Present on Admission         # Hyponatremia: Lowest Na = 129 mmol/L (Ref range: 136-145) in last 2 days, will monitor as appropriate      # Hypoalbuminemia: Lowest albumin = 1.9 g/dL (Ref range: 3.5-5.2) at 11/11/2022  5:15 AM, will monitor as appropriate   # Thrombocytopenia: Lowest platelets = 65 (Ref range: 150-450) " "in last 2 days, will monitor for bleeding       # Obesity: Estimated body mass index is 32.54 kg/m  as calculated from the following:    Height as of this encounter: 1.651 m (5' 5\").    Weight as of this encounter: 88.7 kg (195 lb 8.8 oz).                 Interval History:     doing well; no cp, sob, n/v/d, or abd pain. Right LE remains swollen with some erythema but appears somewhat chronic in appearance.                Medications:       cefTRIAXone  1 g Intravenous Q24H     cephALEXin  500 mg Oral Q6H STEPHANI     furosemide  40 mg Oral Daily     gabapentin  100 mg Oral TID     pantoprazole  40 mg Oral Daily     propranolol  20 mg Oral BID     sodium chloride (PF)  3 mL Intracatheter Q8H                  Physical Exam:     Vitals:    22 0603 22 0833 22 0836 22 0949   BP: 119/60 (!) 103/48 109/60 112/57   BP Location: Right arm Right arm Right arm Right arm   Patient Position: Semi-Brady's Semi-Brady's Semi-Brady's Semi-Brady's   Pulse: 65 59     Resp: 18 18     Temp: 98  F (36.7  C) 97.9  F (36.6  C)     TempSrc: Tympanic Tympanic     SpO2: 97%      Weight:       Height:             Vital Sign Ranges  Temperature Temp  Av.8  F (36.6  C)  Min: 97.6  F (36.4  C)  Max: 98  F (36.7  C)   Blood pressure Systolic (24hrs), Av , Min:99 , Max:129        Diastolic (24hrs), Av, Min:48, Max:71      Pulse Pulse  Av.3  Min: 59  Max: 77   Respirations Resp  Avg: 15.9  Min: 11  Max: 27   Pulse oximetry SpO2  Av.4 %  Min: 91 %  Max: 99 %       No intake or output data in the 24 hours ending 22 1049    General:  Alert and Orientated.  NAD.  Heart:  RRR, S1 S2, No murmurs, no rubs, no gallops.  Resp: CTA bilaterally.  No wheezes, no rhonchi, no crackles.  Abd: Soft/NT/ND/Positve BS.  Ext: RLE erythema and bruising with +3 to +4 edema up to the knee.    Neuro:  No focal Neurologic deficits noted.    Peripheral IV 11/10/22 Anterior;Left Upper forearm (Active)   Site Assessment WDL " 11/11/22 0833   Line Status Saline locked 11/11/22 0833   Phlebitis Scale 0-->no symptoms 11/11/22 0833   Infiltration Scale 0 11/11/22 0833   Number of days: 1     No line/device             Data:     Lab Results   Component Value Date    WBC 5.3 11/11/2022    HGB 8.4 (L) 11/11/2022    HCT 26.8 (L) 11/11/2022    PLT 85 (L) 11/11/2022     (L) 11/11/2022    POTASSIUM 4.1 11/11/2022    CHLORIDE 101 11/11/2022    CO2 25 11/11/2022    BUN 12.0 11/11/2022    CR 0.90 11/11/2022     (H) 11/11/2022    SED 86 (H) 11/10/2022    AST 78 (H) 11/11/2022    ALT 28 11/11/2022    ALKPHOS 128 11/11/2022    BILITOTAL 4.5 (H) 11/11/2022    INR 2.09 (H) 11/04/2022     Lactic Acid   Date Value Ref Range Status   11/10/2022 1.5 0.7 - 2.0 mmol/L Final   11/10/2022 2.0 0.7 - 2.0 mmol/L Final       Misha Mcdaniel, DO

## 2022-11-11 NOTE — PHARMACY-VANCOMYCIN DOSING SERVICE
Pharmacy Vancomycin Initial Note  Date of Service 2022  Patient's  1970  52 year old, male    Indication: Skin and Soft Tissue Infection    Current estimated CrCl = Estimated Creatinine Clearance: 100.6 mL/min (based on SCr of 0.9 mg/dL).    Creatinine for last 3 days  11/10/2022: 12:54 PM Creatinine 0.78 mg/dL  2022:  5:15 AM Creatinine 0.90 mg/dL    Recent Vancomycin Level(s) for last 3 days  No results found for requested labs within last 72 hours.      Vancomycin IV Administrations (past 72 hours)                   vancomycin (VANCOCIN) 1,500 mg in 0.9% NaCl 250 mL intermittent infusion (mg) 1,500 mg New Bag 11/10/22 1415                Nephrotoxins and other renal medications (From now, onward)    Start     Dose/Rate Route Frequency Ordered Stop    22 1330  vancomycin (VANCOCIN) 1,500 mg in 0.9% NaCl 250 mL intermittent infusion         1,500 mg  over 90 Minutes Intravenous EVERY 24 HOURS 22 1319      11/10/22 2100  furosemide (LASIX) tablet 40 mg         40 mg Oral DAILY 11/10/22 2048 22 0859    11/10/22 2010  ketorolac (TORADOL) injection 30 mg         30 mg Intravenous EVERY 6 HOURS PRN 11/10/22 2010            Contrast Orders - past 72 hours (72h ago, onward)    Start     Dose/Rate Route Frequency Stop    11/10/22 1855  iopamidol (ISOVUE-370) solution 98 mL         98 mL Intravenous ONCE 11/10/22 1905          [employer]ight[employer] Prediction of Planned Initial Vancomycin Regimen  Loading dose: 1750 mg  Regimen: 1250 mg IV every 12 hours.  Start time: 14:27 on 2022  Exposure target: AUC24 (range)400-600 mg/L.hr   AUC24,ss: 536 mg/L.hr  Probability of AUC24 > 400: 79 %  Ctrough,ss: 16.7 mg/L  Probability of Ctrough,ss > 20: 35 %  Probability of nephrotoxicity (Lodise ALEXIA ): 12 %       Plan:  1. Start vancomycin  1750 mg IV x1 followed by 1250 mg IV q12h.   2. Vancomycin monitoring method: AUC  3. Vancomycin therapeutic monitoring goal: 400-600 mg*h/L  4. Pharmacy  will check vancomycin levels as appropriate in 1-3 Days.    5. Serum creatinine levels will be ordered daily for the first week of therapy and at least twice weekly for subsequent weeks.      Pancho Parra, [facility]

## 2022-11-11 NOTE — PROGRESS NOTES
Spoke with patient about home CPAP machine.  Doesn't know his pressure and will have someone bring in home machine tomorrow if he stays another night.  States will be fine without it tonight.

## 2022-11-11 NOTE — CARE PLAN
Prior to Admission Medication Reconciliation:     Medications added:   [] None  [x] As listed below:    Iron, vitamin c, thiamine and folic acid- [facility]. Pt reports he still takes.     carafate- see note below    effexor- started 5/12/22- see note below. Pt is not compliant    Medications deleted:   [x] None  [] As listed below:    Medications marked for review/removal by attending:  [x] None  [] As listed below:    Changes made to existing medications:   [x] None  [] Updated time of day, strengths and frequencies to most current.     Pertinent notes/medications patient takes different than prescribed:     Supplements- pt reports [facility] not be discontinued by [facility]. Still takes, uses OTC. Input how he reports taking at home.       Sertraline- has not been filled since 2019. Pt reports he is not taking. [facility]. Reports he is supposed to be taking.       effexor- last filled 30 ds in May. Pt is not taking as prescribed, last took a couple of weeks ago. Reports he is supposed to be taking.       Cleocin- reports taking as prescribed since getting on 11/4. Does not know how many doses he took PTA or how many he has left.       norco- has but reports it is ineffective and he has hardly used      Flexeril- has, but reports has not started pta       carafate- pt takes different than prescribed, prescribed TID, takes once daily (should be out, but reports he still has)      Pt very guarded about medication questions.     Last times/dates taken verified with patient:  [x] Yes- completed myself  [] Did not review with patient. Rx verification only. Review completed by nursing.    [] Nurse completed no changes made (double checked entries)  [] Unable to review with patient at this time:    Allergy review:    []Did not review: reviewed by nursing  [x]Allergies reviewed and updated if needed.     Medication reconciliation sources:   [x]Patient  []Patient family member/emergency contact: **  [x][facility] Report Review  [x]Epic Chart Review  [x]Care Everywhere review  [x]Pharmacy med list/phone call: [facility]  gabapentin 100 mg 8/8/22 30 ds 1 cap TID pain  Pantoprazole 40 mg daily 8/8/22 90 ds  Propranolol 20 mg BID 8/10/22 90 ds  Sertraline 100 mg daily  - 2019  effexor May 2022  []Outside meds dispense report:   []Fill dates reflect compliancy. No concerns.  [x]Fill dates do not reflect compliancy on the following medications:     Vitamins- pt uses OTC    Sertraline- not taking    effexor- does not take as prescribed but still has at home    carafate- takes different than prescribed  []HomeCare medlist, Nursing home or Assisted Living MAR:  []Behavioral Health Provider  []Other: **    Pharmacy desired at discharge: [facility]    Is patient on coumadin?   [x]No  []Yes    Historian accuracy:  [] Excellent- alert and oriented, understands why meds were prescribed and how to take, able to answer specifics  [x] Good- alert and oriented, understands why meds were prescribed and how to take, some confusion   [] Fair- alert and oriented, doesn't know medications without list, cannot answer specifics about medications, but has a decent process for which to take at home  [] Poor- does not know medications, may not have a process to take at home, may be cognitively unable to review at this time  []Medication management done by family member or facility, no concerns about historian accuracy.   [] Did not review with patient. Cannot assess.     Medication Management:  [x] Manages meds independently  [] Family member/ other party manages meds/assists:  [] Meds managed by staff at facility  [] Meds set up by home care, family/other party helps administer  [] Meds set up by home care, self administers  [] Did not review with patient. Cannot assess.     Other medications aside from PTA:  [x] Denies taking any other medications aside from those listed in PTA meds, this includes over-the-counter vitamins, supplements and  analgesics.       Trish Betancourt on 11/11/2022 at 10:22 AM       Notifying appropriate party of changes/additions/discrepancies:  [x]No pertinent changes made, notification not necessary.   [] Notified attending provider via text page/phone call/sticky note or other:  [] Notified other:  [] Medications have not been reconciled by a provider yet, notification not necessary  [] Pt is not admitted to floor yet or patient is boarding, PTA meds completed before admission.   Medications Prior to Admission   Medication Sig Dispense Refill Last Dose     clindamycin (CLEOCIN) 300 MG capsule Take 1 capsule (300 mg) by mouth 4 times daily for 10 days 40 capsule 0 11/10/2022     cyclobenzaprine (FLEXERIL) 10 MG tablet Take 10 mg by mouth 3 times daily as needed for muscle spasms        ferrous gluconate (FERGON) 324 (38 Fe) MG tablet Take 324 mg by mouth daily   11/9/2022     folic acid (FOLVITE) 1 MG tablet Take 1 mg by mouth daily   11/9/2022     gabapentin (NEURONTIN) 100 MG capsule Take 100 mg by mouth 3 times daily   Past Week     HYDROcodone-acetaminophen (NORCO) 5-325 MG tablet Take 1 tablet by mouth every 6 hours as needed for pain .Dispensed 18 tabs on 11/4.   Past Week     pantoprazole (PROTONIX) 40 MG EC tablet Take 40 mg by mouth daily for stomach acid. Take 30 minutes before breakfast.   11/10/2022     propranolol (INDERAL) 20 MG tablet Take 20 mg by mouth 2 times daily   11/10/2022 at 0800     sucralfate (CARAFATE) 1 GM tablet Take 1 g by mouth 3 times daily (before meals)   Past Week     thiamine (B-1) 100 MG tablet Take 100 mg by mouth every other day   11/9/2022     venlafaxine (EFFEXOR XR) 37.5 MG 24 hr capsule Take 1 capsule by mouth once daily for 7 days, then take 2 capsules every day thereafter for mood and/or pain.   Past Month     vitamin C (ASCORBIC ACID) 500 MG tablet Take 1,000 mg by mouth every other day   11/9/2022     sertraline (ZOLOFT) 100 MG tablet Take 100 mg by mouth daily (Patient not taking:  Reported on 11/11/2022)   Not Taking

## 2022-11-11 NOTE — UTILIZATION REVIEW
"Admission Status; Secondary Review Determination     Admission Date: 11/10/2022 12:35 PM       Under the authority of the Utilization Management Committee, the utilization review process indicated a secondary review on the above patient.  The review outcome is based on review of the medical records, discussions with staff, and applying clinical experience noted on the date of the review.          (x) Observation Status Appropriate - This patient does not meet hospital inpatient criteria and is placed in observation status. If this patient's primary payer is Medicare and was admitted as an inpatient, Condition Code 44 should be used and patient status changed to \"observation\".       RATIONALE FOR DETERMINATION      Brief clinical presentation, information copied from the chart, abbreviated and edited for relevant content:     Discussed with attending. Agreed to change to OBS. Not meeting IP criteria for cellulitis. Stable.       Marlon Guy is a 52 year old male admitted on 11/10/2022. He he was diagnosed with RLE cellulitis 11.4.2022, treated with clindamycin, he is back to ED with RLE edema, redness, pain. He is afebrile, inflammatory markers improved since treatment starting, but edema, redness persist or even worse. No fever, and WBC normal (was 17.4 11.4.2022, which allows me to believe that clindamycin is \"working' but edema is due to portal HTN and possibly liver cirrhosis.   History of previous fracture of RLE and ankle but denies any chronic LE edema.       . The severity of illness, intensity of cares provided, risk for adverse outcome, and expected LOS make the care appropriate for observation.       The information on this document is developed by the utilization review team in order for the business office to ensure compliance.  This only denotes the appropriateness of proper admission status and does not reflect the quality of care rendered.         The definitions of Inpatient Status and " Observation Status used in making the determination above are those provided in the CMS Coverage Manual, Chapter 1 and Chapter 6, section 70.4.      Sincerely,     Mey Bob MD   Utilization Review/ Case Management  [facility].

## 2022-11-11 NOTE — PLAN OF CARE
"  Most recent vitals: /57 (BP Location: Right arm, Patient Position: Semi-Brady's)   Pulse 62   Temp 97.3  F (36.3  C) (Tympanic)   Resp 16   Ht 1.651 m (5' 5\")   Wt 88.7 kg (195 lb 8.8 oz)   SpO2 97%   BMI 32.54 kg/m      Pain Management:  Pt c/o pain to RLE 10/10 with movement did give PRN norcox1 with relief  LOC:  A&O  Cardiac:  HR soledad on telemetry per ICU SB SR 50-60s  Respiratory:  Lungs clear on RA w/ O2 sats 97%  GI:  WNL bowel sounds active  :  WNL   Skin Issues:  RLE red, scant drainage, RLE elevated this shift, 4+ edema as charted    IVF:  IV SL  ABX:  IV rochephin, IV vanco started this shift, PO keflex discontinued this shift    Nutrition: reg diet good appetite  ADL's:  ind  Ambulation:ind  Safety:  Call light within reach, uses call light appropriately, makes needs known    Face to face report given with opportunity to observe patient.    Report given to TOBI Martínez RN   11/11/2022  3:16 PM            "

## 2022-11-11 NOTE — PROVIDER NOTIFICATION
Text/page MD pt /48 recheck other arm 109/60, HR soledad 57-60. MD ok to give lasix PO and hold propanolol AM dose

## 2022-11-11 NOTE — PLAN OF CARE
"Most recent vitals: /60 (BP Location: Right arm, Patient Position: Semi-Brady's)   Pulse 65   Temp 98  F (36.7  C) (Tympanic)   Resp 18   Ht 1.651 m (5' 5\")   Wt 88.7 kg (195 lb 8.8 oz)   SpO2 97%   BMI 32.54 kg/m        A&Ox4, Up ad miri, will call if he needs extra help. PRN IV toradol given prior to bed with scheduled gabapentin and PRN tylenol  and in the morning PRN IV toradol, with good relief reported. RLE remains red, warm, peeling and weeping. Has had RLE elevated all evening and night. Remainder of his skin in unremarkable. Slept well through the night and in between cares. PIV remains saline locked. Call light within reach, able to make needs known. Will call appropriately.       Face to face report given with opportunity to observe patient.    Report given to Sandra BRITTON.    Sabrina Delgado RN   11/11/2022  7:52 AM    "

## 2022-11-11 NOTE — H&P
"[facility]    History and Physical - Hospitalist Service       Date of Admission:  11/10/2022    Assessment & Plan      Marlon Guy is a 52 year old male admitted on 11/10/2022. He he was diagnosed with RLE cellulitis 11.4.2022, treated with clindamycin, he is back to ED with RLE edema, redness, pain. He is afebrile, inflammatory markers improved since treatment starting, but edema, redness persist or even worse. No fever, and WBC normal (was 17.4 11.4.2022, which allows me to believe that clindamycin is \"working' but edema is due to portal HTN and possibly liver cirrhosis. .     Hospital problems:   1. Cellulitis RLE  - he is definitely improving  - continue Rocephin IV  - f/u cultures  2. Portal hypertension  - most likely developing liver cirrhosis  - will get liver US in AM    3. Abnormal liver function tests  - due to alcohol abuse  - [facility] and [facility]    4. MARY  - uses CPAP  - consult RT    5. RLE edema  - no DVT x 2 (11.4 and today)  - start lasix 40 mg po daily x 4 days  - elevate the leg    6. Hyponatremia  - dilutional due to liver disease    7. Anemia  - normocytic  - monitor  - no clinical signs of bleeding    8. Low albumin  - multifactorial  - liver dz and nutritional  - may need dietary consult.      Diet: Regular Diet Adult    DVT Prophylaxis: Pneumatic Compression Devices to the left leg  Rivera Catheter: Not present  Central Lines: None  Cardiac Monitoring: None  Code Status:   full code    Clinically Significant Risk Factors Present on Admission         # Hyponatremia: Lowest Na = 129 mmol/L (Ref range: 136-145) in last 2 days, will monitor as appropriate      # Hypoalbuminemia: Lowest albumin = 2.4 g/dL (Ref range: 3.5-5.2) at 11/10/2022 12:54 PM, will monitor as appropriate   # Thrombocytopenia: Lowest platelets = 65 (Ref range: 150-450) in last 2 days, will monitor for bleeding       # Obesity: Estimated body mass index is 32.54 kg/m  as " "calculated from the following:    Height as of this encounter: 1.651 m (5' 5\").    Weight as of this encounter: 88.7 kg (195 lb 8.8 oz).           Disposition Plan home when stable.     Expected Discharge Date: 11/12/2022                The patient's care was discussed with the Bedside Nurse and Patient.    Amari Young MD  Hospitalist Service  [facility]  Securely message with the Vocera Web Console (learn more here)  Text page via [facility] Paging/Directory         ______________________________________________________________________    Chief Complaint   RLE redness, swelling, pain with ambulation    History is obtained from the patient, electronic health record and emergency department physician    History of Present Illness   Marlon Guy is a 52 year old male who has h/o alcohol abuse, MARY, anemia who was diagnosed with cellulitis 11.5.2022 (presented afebrile, but inflammatory markers were high). DVT was r/o and he was discharged home on clindamycin.   Was taking clindamycin as prescribed. He denies fever, but RLE edema worsened and pain with ambulation.   Today ED w/u showed RLE edema (very asymmetrical extremity from left) - 3+ edema), much improved inflammatory markers, negative study for DVT. New finding discovered during today's w/u is portal hypertension, mild ascites. No masses or lesions of venous compression or thrombosis of the inferior vena  cava iliac common femoral superficial femoral and popliteal veins.   He will be admitted to continue treatment of cellulitis, diuresis to decrease edema and pain. Rocephin will be continued, but vancomycin will be stopped.  Other tests results: na 129 Albumin 2.4  133 ALT  35 Bili total 5.2 CRP 26 (was 103 (11.4.2022) procalcitonin 0.12  Hg 9.1 WBC 5.9 (WBC was 17.4 11.4.2022).  Platelets 65 (stable low).   Sed rate 86 today (37 11.4.2022).     Review of Systems    12 points of ROS obtained. He only c/o RLE edema, redness and pain " which worsens when he is up and walking.   The rest is negative.     Past Medical History    I have reviewed this patient's medical history and updated it with pertinent information if needed.   Past Medical History:   Diagnosis Date     Anemia        Past Surgical History   I have reviewed this patient's surgical history and updated it with pertinent information if needed.  Past Surgical History:   Procedure Laterality Date     ORTHOPEDIC SURGERY         Social History   I have reviewed this patient's social history and updated it with pertinent information if needed.  Social History     Tobacco Use     Smoking status: Former     Types: Cigarettes     Smokeless tobacco: Current     Types: Chew   Substance Use Topics     Alcohol use: Yes     Alcohol/week: 50.0 standard drinks     Types: 50 Cans of beer per week     Comment: daily     Drug use: Never       Family History         Prior to Admission Medications   Prior to Admission Medications   Prescriptions Last Dose Informant Patient Reported? Taking?   HYDROcodone-acetaminophen (NORCO) 5-325 MG tablet Past Week  Yes Yes   Sig: Take 1 tablet by mouth every 6 hours as needed for pain .Dispensed 18 tabs on 11/4.   clindamycin (CLEOCIN) 300 MG capsule 11/10/2022  No Yes   Sig: Take 1 capsule (300 mg) by mouth 4 times daily for 10 days   cyclobenzaprine (FLEXERIL) 10 MG tablet   Yes Yes   Sig: Take 10 mg by mouth 3 times daily as needed for muscle spasms   gabapentin (NEURONTIN) 100 MG capsule Past Week  Yes Yes   Sig: Take 100 mg by mouth 3 times daily   pantoprazole (PROTONIX) 40 MG EC tablet 11/10/2022  Yes Yes   Sig: Take 40 mg by mouth daily for stomach acid. Take 30 minutes before breakfast.   propranolol (INDERAL) 20 MG tablet 11/10/2022 at 0800  Yes Yes   Sig: Take 20 mg by mouth 2 times daily   sertraline (ZOLOFT) 100 MG tablet Past Week  Yes Yes   Sig: Take 100 mg by mouth daily      Facility-Administered Medications: None     Allergies   Allergies   Allergen  Reactions     Amoxicillin Rash       Physical Exam   Vital Signs: Temp: 97.9  F (36.6  C) Temp src: Tympanic BP: 124/62 Pulse: 69   Resp: 18 SpO2: 96 % O2 Device: None (Room air)    Weight: 195 lbs 8.77 oz    General Appearance: he is not in distress. Comfortable, eating supper  Eyes: mild icterus  HEENT: OP clear  Respiratory: clear BS bilaterally.   Cardiovascular: regular, no S3  GI: liver edge palpable, edge firm, cannot appreciate ascites  Lymph/Hematologic: no LAD, no petechiae  Genitourinary: bladder not palpable.   Skin: RLE very edematous, skin erythematous, areas of oozing. No crepitus. No abscess or fluid collection. (see pictures)  Musculoskeletal: mild sarcopenia  Neurologic: non-focal  Psychiatric: calm, appropriate affect, oriented x 4    Data   Data reviewed today: I reviewed all medications, new labs and imaging results over the last 24 hours. I personally reviewed blood work.   Images reviewed and reported by radiologist.   Recent Labs   Lab 11/10/22  1254 11/04/22  1244   WBC 5.9 17.4*   HGB 9.1* 10.5*   MCV 84 82   PLT 65* 76*   INR  --  2.09*   * 122*   POTASSIUM 4.7 4.1   CHLORIDE 99 91*   CO2 24 22   BUN 8.7 22.3*   CR 0.78 0.93   ANIONGAP 6* 9   GÉNESIS 7.6* 7.8*   * 130*   ALBUMIN 2.4*  --    PROTTOTAL 8.4*  --    BILITOTAL 5.2*  --    ALKPHOS 134*  --    ALT 35  --    *  --      5.9    \    9.1 (L)    /    65 (L)   N 73    L N/A    129 (L)    99    8.7 /   ------------------------------------ 142 (H)   ALT 35    (H)    (H)   ALB 2.4 (L)   Ca 7.6 (L)  4.7    24    0.78 \    % RETIC N/A    LDH N/A  Troponin N/A    BNP N/A    CK N/A  INR N/A   PTT N/A    D-dimer N/A    Fibrinogen N/A    Antithrombin N/A  Ferritin N/A  CRP 26.43 (H)    IL-6 N/A  Recent Results (from the past 24 hour(s))   US Lower Extremity Non Vascular Right    Narrative    US LOWER EXTREMITY NON VASCULAR RIGHT    HISTORY: Right lower extremity swelling.    COMPARISON: Venous ultrasound  11/4/2022.    TECHNIQUE: Ultrasound in the area of clinical concern within the right  calf.    FINDINGS:    Extensive subcutaneous edema and skin thickening is seen. No  well-defined fluid collection is identified.        Impression    IMPRESSION:     Extensive subcutaneous edema.      ROYAL SIMMS MD         SYSTEM ID:  [id number]   CTA Abdomen Pelvis Bilat Leg Runoff w Contr    Narrative    PROCEDURE: CTA ABDOMEN PELVIS BILAT LEG RUNOFF W CONTR 11/10/2022 7:32  PM    HISTORY: swelling ? mass?    COMPARISONS: None.    Meds/Dose Given: ISOVUE 370 98ml    TECHNIQUE: CT scan of the abdomen and pelvis with IV contrast.  Sagittal coronal reconstructions were obtained    CT scan of both lower extremities in the venous phase. Sagittal and  coronal map reconstructions were obtained    FINDINGS: CT scan of the abdomen and pelvis: The lung bases are clear  there liver is free of masses or biliary ductal enlargement. Calcified  gallstones are seen. There is marked splenomegaly. The portal vein is  patent. The umbilical vein is recanalized. Pancreas appears normal.  The adrenal glands are normal. The right left kidneys are free of  masses or hydronephrosis. The periaortic lymph nodes are normal in  caliber.    There is a small amount of ascites. The inferior vena cava is patent.  Common and external iliac veins are patent. No thrombus is seen in the  iliac veins.    No bowel abnormalities are seen. The bladder and rectum are normal.  Degenerative changes are present in the thoracic and lumbar spine    CT scan of both lower extremities: There is extensive subcutaneous  edema seen in the right leg. The edema is more severe laterally than  medially. No discrete abscess is seen. The muscles of both lower  extremities appear normal. No venous thrombosis is seen. The popliteal  superficial femoral and common femoral veins are patent bilaterally.    This been internal fixation at the right ankle with screws and  sideplates.            Impression    IMPRESSION: Marked splenomegaly, recanalization of the umbilical vein  and ascites suggestive of portal hypertension.    No evidence of venous compression or thrombosis of the inferior vena  cava iliac common femoral superficial femoral and popliteal veins.    Extensive subcutaneous edema seen in the right leg worse laterally  than medially    ESTUARDO AVINA MD         SYSTEM ID:  [id number]

## 2022-11-12 VITALS
TEMPERATURE: 97.3 F | RESPIRATION RATE: 16 BRPM | BODY MASS INDEX: 32.58 KG/M2 | OXYGEN SATURATION: 100 % | SYSTOLIC BLOOD PRESSURE: 114 MMHG | WEIGHT: 195.55 LBS | HEIGHT: 65 IN | HEART RATE: 74 BPM | DIASTOLIC BLOOD PRESSURE: 56 MMHG

## 2022-11-12 DIAGNOSIS — L03.119 CELLULITIS OF LOWER EXTREMITY, UNSPECIFIED LATERALITY: Primary | ICD-10-CM

## 2022-11-12 LAB
CRP SERPL-MCNC: 21.18 MG/L
POTASSIUM SERPL-SCNC: 4.4 MMOL/L (ref 3.4–5.3)

## 2022-11-12 PROCEDURE — G0378 HOSPITAL OBSERVATION PER HR: HCPCS

## 2022-11-12 PROCEDURE — 99217 PR OBSERVATION CARE DISCHARGE: CPT | Performed by: INTERNAL MEDICINE

## 2022-11-12 PROCEDURE — 250N000011 HC RX IP 250 OP 636: Performed by: INTERNAL MEDICINE

## 2022-11-12 PROCEDURE — 84132 ASSAY OF SERUM POTASSIUM: CPT | Performed by: INTERNAL MEDICINE

## 2022-11-12 PROCEDURE — 36415 COLL VENOUS BLD VENIPUNCTURE: CPT | Performed by: INTERNAL MEDICINE

## 2022-11-12 PROCEDURE — 96376 TX/PRO/DX INJ SAME DRUG ADON: CPT

## 2022-11-12 PROCEDURE — 250N000013 HC RX MED GY IP 250 OP 250 PS 637: Performed by: INTERNAL MEDICINE

## 2022-11-12 PROCEDURE — 258N000003 HC RX IP 258 OP 636: Performed by: INTERNAL MEDICINE

## 2022-11-12 PROCEDURE — 86140 C-REACTIVE PROTEIN: CPT | Performed by: INTERNAL MEDICINE

## 2022-11-12 RX ORDER — SULFAMETHOXAZOLE/TRIMETHOPRIM 800-160 MG
2 TABLET ORAL 2 TIMES DAILY
Qty: 16 TABLET | Refills: 0 | Status: SHIPPED | OUTPATIENT
Start: 2022-11-12

## 2022-11-12 RX ORDER — HYDROCODONE BITARTRATE AND ACETAMINOPHEN 5; 325 MG/1; MG/1
1 TABLET ORAL 3 TIMES DAILY PRN
Qty: 15 TABLET | Refills: 0 | Status: SHIPPED | OUTPATIENT
Start: 2022-11-12

## 2022-11-12 RX ORDER — SULFAMETHOXAZOLE/TRIMETHOPRIM 800-160 MG
2 TABLET ORAL 2 TIMES DAILY
Status: DISCONTINUED | OUTPATIENT
Start: 2022-11-12 | End: 2022-11-12 | Stop reason: HOSPADM

## 2022-11-12 RX ADMIN — SULFAMETHOXAZOLE AND TRIMETHOPRIM 2 TABLET: 800; 160 TABLET ORAL at 07:55

## 2022-11-12 RX ADMIN — PANTOPRAZOLE SODIUM 40 MG: 40 TABLET, DELAYED RELEASE ORAL at 07:56

## 2022-11-12 RX ADMIN — VANCOMYCIN HYDROCHLORIDE 1250 MG: 1 INJECTION, POWDER, LYOPHILIZED, FOR SOLUTION INTRAVENOUS at 09:56

## 2022-11-12 RX ADMIN — CEFTRIAXONE SODIUM 1 G: 1 INJECTION, SOLUTION INTRAVENOUS at 09:29

## 2022-11-12 RX ADMIN — ACETAMINOPHEN 650 MG: 325 TABLET ORAL at 07:56

## 2022-11-12 RX ADMIN — HYDROCODONE BITARTRATE AND ACETAMINOPHEN 1 TABLET: 5; 325 TABLET ORAL at 02:36

## 2022-11-12 RX ADMIN — VANCOMYCIN HYDROCHLORIDE 1250 MG: 1 INJECTION, POWDER, LYOPHILIZED, FOR SOLUTION INTRAVENOUS at 00:58

## 2022-11-12 RX ADMIN — GABAPENTIN 100 MG: 100 CAPSULE ORAL at 07:56

## 2022-11-12 RX ADMIN — PROPRANOLOL HYDROCHLORIDE 20 MG: 20 TABLET ORAL at 07:56

## 2022-11-12 RX ADMIN — FUROSEMIDE 40 MG: 40 TABLET ORAL at 07:56

## 2022-11-12 ASSESSMENT — ACTIVITIES OF DAILY LIVING (ADL)
ADLS_ACUITY_SCORE: 21

## 2022-11-12 NOTE — PLAN OF CARE
"/56 (BP Location: Right arm, Patient Position: Semi-Brady's, Cuff Size: Adult Regular)   Pulse 74   Temp 97.3  F (36.3  C) (Tympanic)   Resp 16   Ht 1.651 m (5' 5\")   Wt 88.7 kg (195 lb 8.8 oz)   SpO2 100%   BMI 32.54 kg/m      Pt A&O x4. Cooperative with cares. IV rocephin & IV vancomycin given early in anticipation of discharge home. TELE showed SR; no calls. Independent in room with a steady gait. Voids with no issues. No BM this shift. VSS. Adequate oral intake with good appetite. Dressed himself independently.     Sonja Connor RN on 11/12/2022 at 12:10 PM           "

## 2022-11-12 NOTE — PLAN OF CARE
"/63 (BP Location: Right arm, Patient Position: Supine, Cuff Size: Adult Regular)   Pulse 59   Temp 97  F (36.1  C) (Tympanic)   Resp 16   Ht 1.651 m (5' 5\")   Wt 88.7 kg (195 lb 8.8 oz)   SpO2 96%   BMI 32.54 kg/m      Pt A&O, afebrile, VS and assessment as charted, ind in room, free of falls or injury this shift. K 4.4 this AM. On tele - see ICU RN charting in the flowsheets. Pain to RLE - given norco x1. 3/10 this AM and offered meds but pt declined. Ice packs have been on all night. Cellulitis scaly and dry in some areas, open and weeping in others - pad underneath. On IV vanco and rocephin - IV SL currently. R pedal pulse doppler - marked with X. RLE edematous.     Face to face report given with opportunity to observe patient.    Report given to TOBI Chandler RN   11/12/2022  7:32 AM    "

## 2022-11-12 NOTE — DISCHARGE INSTRUCTIONS
Call the clinic Monday morning to set up a Hospital follow up appointment with your Primary Care Provider next week.  Repeat labs, CBC, CMP and CRP.

## 2022-11-12 NOTE — PLAN OF CARE
Took over pt care at 2100. Pt uneventful during this time, see previous nurses note.    Face to face report given with opportunity to observe patient.    Report given to Deysi Garcia RN   11/11/2022  11:06 PM

## 2022-11-12 NOTE — PLAN OF CARE
Goal Outcome Evaluation:    Patient discharged at 12:10 PM via wheel chair accompanied by significant other and staff. Prescriptions sent to patients preferred pharmacy. All belongings sent with patient.     Discharge instructions reviewed with patient and his wife. Listed belongings gathered and returned to patient. Clothing, cell phone, glasses, wallet    Patient discharged to home self care.       [facility] [place]  Follow up appointment made:  No  Home medications returned to patient: N/A  Patient reports pain was well managed at discharge: Yes     Sonja Connor RN on 11/12/2022 at 12:11 PM

## 2022-11-12 NOTE — DISCHARGE SUMMARY
Admit Date: 11/10/2022  Discharge Date: 11/12/2022    ADMISSION DIAGNOSIS:  Right lower extremity infection.    DISCHARGE DIAGNOSES:     1.  Cellulitis of the right lower extremity.  2.  Right lower extremity edema.  3.  Normocytic anemia.  4.  Thrombocytopenia.  5.  Hyponatremia.  6.  Transaminitis.  7.  Hypoalbuminemia.    DISPOSITION:  To home, self-care.    PROCEDURES AND CONSULTATIONS:  A CTA of the abdomen and pelvis with leg runoff an ultrasound of the right lower extremity.    PENDING TEST RESULTS AT THE TIME OF DISCHARGE:  None.    HISTORY OF PRESENT ILLNESS:  Please see admission H and P.    PAST MEDICAL HISTORY:  Please see admission H and P.    HOSPITAL COURSE:  Mr. Guy is a 52-year-old male who presents to the Emergency Department on 11/10/2022 with erythema and swelling of the right lower extremity.  It is noted that he was seen in the Emergency Department on 11/04/2022 for the same issue at which time he was prescribed clindamycin for right lower extremity cellulitis.  He apparently was taking clindamycin as prescribed.  Nevertheless, re-presented back to the Emergency Department on 11/10/2022 with ongoing swelling and erythema of the right lower extremity, With his presentation, he did undergo the CT scan of the abdomen and pelvis with bilateral leg runoff, which did not show any evidence of venous compression or thrombosis in the inferior vena cava or the superficial femoral or popliteal veins.  He was noted to have extensive subcutaneous edema in the right leg and marked splenomegaly along with findings suggestive of portal hypertension.  In addition, repeat ultrasound was done of the right lower extremity showing no abscess or evidence of DVT was noted.    The patient was subsequently hospitalized and has been receiving IV Rocephin along with vancomycin over the last 36 hours.  In assessing his leg, he does appear to have some mild erythema and extensive swelling of the right lower extremity;  "however, much of the \"redness\" appears to be compatible with microscopic arterial rupture and venous stasis.  In discussion with the patient, he is agreeable at this time to return home.  He does not have any fever, nor has he had a white count during this hospitalization.  His procalcitonin has been elevated as high as 0.14 and his CRP is trending down at 21.18.  The patient will be discharged at this time with Bactrim 800/160 mg 2 tablets p.o. b.i.d. for 4 additional days.  Norco 5/325 mg PO TID prn dispensed 15#. In addition, he should follow up with a primary care provider.    In addition, it is noted that he does have findings compatible with liver cirrhosis including hyponatremia, hyperbilirubinemia, anemia and thrombocytopenia, along with the findings noted on the CT scan.  Abstinence from alcohol was discussed with the patient.    DISCHARGE MEDICATIONS:  No changes were made to his medications at time of discharge outside of the addition of Bactrim 800/160 mg 2 tablets p.o. b.i.d. for 4 additional days.    PHYSICAL EXAMINATION AT TIME OF DISCHARGE:  VITAL SIGNS:  Blood pressure is 114/56, respirations 16, heart rate 74, temperature 97.3, O2 sat 100% on room air.  GENERAL:  The patient is alert and oriented, in no acute distress.  INTEGUMENT: Remarkable for some jaundice.  HEART:  Regular rate and rhythm.  No murmurs, rubs, or gallops.  LUNGS:  Clear to auscultation bilaterally.  ABDOMEN:  Soft with positive bowel sounds.  May appreciate some hepatosplenomegaly on exam.  EXTREMITIES:  Right lower extremity with to +3 to +4 lower extremity edema along with bruising and minimal erythema.    CODE STATUS:  Full.    DIET:  Regular.    ACTIVITY:  As tolerated.    FOLLOWUP APPOINTMENTS AND REFERRALS:  The patient should follow up with primary care in the next week.    Misha Mcdaniel DO        D: 11/12/2022   T: 11/12/2022   MT: [id number]    Name:     MEGA QUINTANILLA  MRN:      [id number]        Account:    "   [id number]   :      1970           Service Date: 11/10/2022                                  Discharge Date: 2022     Document: [id number]

## 2022-11-12 NOTE — PROVIDER NOTIFICATION
Nurse Rx communicated with pharmacy per provider's request to ask about moving up IV abx times in anticipation of late morning discharge today.     Sonja Connor RN on 11/12/2022 at 6:57 AM

## 2022-11-12 NOTE — PLAN OF CARE
"Goal Outcome Evaluation:    Plan of Care Reviewed With: patient- Dr. Mcdaniel did stop in to discuss plan of care with patient at approx 1530.   Reason for hospital stay:  Cellulitis   Living situation PTA: home  Most recent vitals: /53 (BP Location: Right arm)   Pulse 60   Temp 98  F (36.7  C) (Tympanic)   Resp 16   Ht 1.651 m (5' 5\")   Wt 88.7 kg (195 lb 8.8 oz)   SpO2 98%   BMI 32.54 kg/m    Pain Management:  rates discomfort 4/10- hasn't asked for much pain meds this shift reported that has marked more \"pressure\" discomfort when bears weight.  Medicated with IV toradol with very good effect at start of shift- stated after he was able to walk and really didn't feel much discomfort. Requested 1 norco prior to sleep tonight for discomfort 4/10.    RLE remains elevated on pillow when in bed.  Using Ice packs to top of area as patient requested.  Declines using PCD to left lower extremity as is getting up in room to ambulate to bathroom.    LOC:  alert and oriented, makes needs known.    Cardiac:  sinus rhythm 60's , decreased pedal pulses with palpation, doppler needed for RLE, marked 3-4 (+) edema as well.   Respiratory: - did hold propranolol to follow dayshift- as bp's remain 100's/60's and HR lower 60's. However patient is asymptomatic- denies any chest pain, shortness of breath, or lightheadness.   GI:  denies problems- abdomen is rounded but soft  :  denies problems  Skin Issues: skin color overall slight jaundiced in appearance.  Area on RLE seems pink to red- overall patient reports less edema/weeping and looks to be staying within boarder.   IVF:  SL  ABX: Vanco q12 scheduled, rocephin every 24 hours- none infused this shift.   Nutrition: good appetite.   ADL's:  independent  Ambulation:independent  Comments:  11/11/2022  7:51 PM  Tanya Gates RN    Face to face report given with opportunity to observe patient.    Report given to Trish Gates RN "   11/11/2022  8:54 PM

## 2022-11-16 LAB
BACTERIA BLD CULT: NO GROWTH
BACTERIA BLD CULT: NO GROWTH

## 2023-07-03 PROCEDURE — 87077 CULTURE AEROBIC IDENTIFY: CPT

## 2023-07-05 ENCOUNTER — LAB REQUISITION (OUTPATIENT)
Dept: LAB | Facility: CLINIC | Age: 53
End: 2023-07-05

## 2023-07-07 LAB — BACTERIA BLD CULT: ABNORMAL

## 2023-10-02 ENCOUNTER — LAB REQUISITION (OUTPATIENT)
Dept: LAB | Facility: CLINIC | Age: 53
End: 2023-10-02

## 2023-10-03 LAB — CMV DNA SPEC NAA+PROBE-ACNC: NOT DETECTED IU/ML

## 2023-10-16 ENCOUNTER — HOSPITAL ENCOUNTER (OUTPATIENT)
Dept: MRI IMAGING | Facility: OTHER | Age: 53
Discharge: HOME | End: 2023-10-16
Attending: PHYSICIAN ASSISTANT | Admitting: PHYSICIAN ASSISTANT
Payer: COMMERCIAL

## 2023-10-16 DIAGNOSIS — D40.0 NEOPLASM OF UNCERTAIN BEHAVIOR OF PROSTATE: ICD-10-CM

## 2023-10-16 PROCEDURE — A9575 INJ GADOTERATE MEGLUMI 0.1ML: HCPCS | Mod: JZ

## 2023-10-16 PROCEDURE — 255N000002 HC RX 255 OP 636: Mod: JZ

## 2023-10-16 PROCEDURE — 76377 3D RENDER W/INTRP POSTPROCES: CPT

## 2023-10-16 RX ORDER — GADOTERATE MEGLUMINE 376.9 MG/ML
15 INJECTION INTRAVENOUS ONCE
Status: COMPLETED | OUTPATIENT
Start: 2023-10-16 | End: 2023-10-16

## 2023-10-16 RX ADMIN — GADOTERATE MEGLUMINE 15 ML: 376.9 INJECTION INTRAVENOUS at 17:10

## 2024-02-15 ENCOUNTER — HOSPITAL ENCOUNTER (OUTPATIENT)
Dept: BONE DENSITY | Facility: OTHER | Age: 54
Discharge: HOME | End: 2024-02-15
Attending: PHYSICIAN ASSISTANT | Admitting: PHYSICIAN ASSISTANT
Payer: COMMERCIAL

## 2024-02-15 DIAGNOSIS — K70.31 ALCOHOLIC CIRRHOSIS OF LIVER WITH ASCITES (H): ICD-10-CM

## 2024-02-15 PROCEDURE — 77080 DXA BONE DENSITY AXIAL: CPT

## 2024-02-20 ENCOUNTER — HOSPITAL ENCOUNTER (EMERGENCY)
Facility: OTHER | Age: 54
Discharge: ANOTHER HOSPITAL | End: 2024-02-20
Attending: STUDENT IN AN ORGANIZED HEALTH CARE EDUCATION/TRAINING PROGRAM | Admitting: STUDENT IN AN ORGANIZED HEALTH CARE EDUCATION/TRAINING PROGRAM
Payer: COMMERCIAL

## 2024-02-20 VITALS
WEIGHT: 180 LBS | HEIGHT: 65 IN | SYSTOLIC BLOOD PRESSURE: 114 MMHG | DIASTOLIC BLOOD PRESSURE: 48 MMHG | TEMPERATURE: 98.9 F | RESPIRATION RATE: 11 BRPM | HEART RATE: 104 BPM | BODY MASS INDEX: 29.99 KG/M2 | OXYGEN SATURATION: 99 %

## 2024-02-20 DIAGNOSIS — N17.9 AKI (ACUTE KIDNEY INJURY) (H): ICD-10-CM

## 2024-02-20 DIAGNOSIS — D72.825 BANDEMIA: ICD-10-CM

## 2024-02-20 DIAGNOSIS — D64.9 ANEMIA, UNSPECIFIED TYPE: ICD-10-CM

## 2024-02-20 DIAGNOSIS — E87.5 HYPERKALEMIA: ICD-10-CM

## 2024-02-20 DIAGNOSIS — E87.1 HYPONATREMIA: ICD-10-CM

## 2024-02-20 LAB
% MONONUCLEAR CELLS, BODY FLUID: 87 %
ABO/RH(D): ABNORMAL
ABSOLUTE POLYNUCLEAR CELLS: 15.3 /UL
ALBUMIN SERPL BCG-MCNC: 3 G/DL (ref 3.5–5.2)
ALBUMIN SERPL BCG-MCNC: 3 G/DL (ref 3.5–5.2)
ALP SERPL-CCNC: 113 U/L (ref 40–150)
ALT SERPL W P-5'-P-CCNC: 48 U/L (ref 0–70)
ANION GAP SERPL CALCULATED.3IONS-SCNC: 13 MMOL/L (ref 7–15)
ANTIBODY ID: NORMAL
ANTIBODY SCREEN: POSITIVE
APPEARANCE FLD: ABNORMAL
AST SERPL W P-5'-P-CCNC: 59 U/L (ref 0–45)
ATRIAL RATE - MUSE: 103 BPM
BASOPHILS # BLD AUTO: 0.1 10E3/UL (ref 0–0.2)
BASOPHILS NFR BLD AUTO: 0 %
BILIRUB SERPL-MCNC: 7.3 MG/DL
BLD PROD TYP BPU: NORMAL
BLD PROD TYP BPU: NORMAL
BLOOD COMPONENT TYPE: NORMAL
BLOOD COMPONENT TYPE: NORMAL
BUN SERPL-MCNC: 91.3 MG/DL (ref 6–20)
CALCIUM SERPL-MCNC: 8.8 MG/DL (ref 8.6–10)
CELL COUNT BODY FLUID SOURCE: ABNORMAL
CHLORIDE SERPL-SCNC: 89 MMOL/L (ref 98–107)
CODING SYSTEM: NORMAL
CODING SYSTEM: NORMAL
COLOR FLD: YELLOW
CREAT SERPL-MCNC: 2.44 MG/DL (ref 0.67–1.17)
CROSSMATCH: NORMAL
CROSSMATCH: NORMAL
DEPRECATED HCO3 PLAS-SCNC: 17 MMOL/L (ref 22–29)
DIASTOLIC BLOOD PRESSURE - MUSE: NORMAL MMHG
EGFRCR SERPLBLD CKD-EPI 2021: 31 ML/MIN/1.73M2
EOSINOPHIL # BLD AUTO: 0 10E3/UL (ref 0–0.7)
EOSINOPHIL NFR BLD AUTO: 0 %
ERYTHROCYTE [DISTWIDTH] IN BLOOD BY AUTOMATED COUNT: 22 % (ref 10–15)
GLUCOSE BLDC GLUCOMTR-MCNC: 142 MG/DL (ref 70–99)
GLUCOSE SERPL-MCNC: 183 MG/DL (ref 70–99)
HCT VFR BLD AUTO: 15.9 % (ref 40–53)
HGB BLD-MCNC: 5.7 G/DL (ref 13.3–17.7)
HOLD SPECIMEN: NORMAL
IMM GRANULOCYTES # BLD: 0.9 10E3/UL
IMM GRANULOCYTES NFR BLD: 3 %
INR PPP: 1.68 (ref 0.85–1.15)
INTERPRETATION ECG - MUSE: NORMAL
ISSUE DATE AND TIME: NORMAL
ISSUE DATE AND TIME: NORMAL
LYMPHOCYTES # BLD AUTO: 0.8 10E3/UL (ref 0.8–5.3)
LYMPHOCYTES NFR BLD AUTO: 2 %
MCH RBC QN AUTO: 35 PG (ref 26.5–33)
MCHC RBC AUTO-ENTMCNC: 35.8 G/DL (ref 31.5–36.5)
MCV RBC AUTO: 98 FL (ref 78–100)
MONOCYTES # BLD AUTO: 1.5 10E3/UL (ref 0–1.3)
MONOCYTES NFR BLD AUTO: 4 %
NEUTROPHILS # BLD AUTO: 30.9 10E3/UL (ref 1.6–8.3)
NEUTROPHILS NFR BLD AUTO: 91 %
NEUTS BAND NFR FLD MANUAL: 13 %
NRBC # BLD AUTO: 0 10E3/UL
NRBC BLD AUTO-RTO: 0 /100
P AXIS - MUSE: 59 DEGREES
PLATELET # BLD AUTO: 86 10E3/UL (ref 150–450)
POTASSIUM SERPL-SCNC: 6.4 MMOL/L (ref 3.4–5.3)
POTASSIUM SERPL-SCNC: 6.8 MMOL/L (ref 3.4–5.3)
PR INTERVAL - MUSE: 138 MS
PROT SERPL-MCNC: 4.5 G/DL (ref 6.4–8.3)
QRS DURATION - MUSE: 100 MS
QT - MUSE: 376 MS
QTC - MUSE: 492 MS
R AXIS - MUSE: 77 DEGREES
RBC # BLD AUTO: 1.63 10E6/UL (ref 4.4–5.9)
RBC # FLD: <2000 /UL
SODIUM SERPL-SCNC: 119 MMOL/L (ref 135–145)
SPECIMEN EXPIRATION DATE: ABNORMAL
SPECIMEN EXPIRATION DATE: NORMAL
SYSTOLIC BLOOD PRESSURE - MUSE: NORMAL MMHG
T AXIS - MUSE: 34 DEGREES
UNIT ABO/RH: NORMAL
UNIT ABO/RH: NORMAL
UNIT NUMBER: NORMAL
UNIT NUMBER: NORMAL
UNIT STATUS: NORMAL
UNIT STATUS: NORMAL
UNIT TYPE ISBT: 6200
UNIT TYPE ISBT: 6200
VENTRICULAR RATE- MUSE: 103 BPM
WBC # BLD AUTO: 34.2 10E3/UL (ref 4–11)
WBC # FLD AUTO: 118 /UL

## 2024-02-20 PROCEDURE — 93010 ELECTROCARDIOGRAM REPORT: CPT | Performed by: INTERNAL MEDICINE

## 2024-02-20 PROCEDURE — 96360 HYDRATION IV INFUSION INIT: CPT | Mod: XS | Performed by: STUDENT IN AN ORGANIZED HEALTH CARE EDUCATION/TRAINING PROGRAM

## 2024-02-20 PROCEDURE — 99291 CRITICAL CARE FIRST HOUR: CPT | Mod: 25 | Performed by: STUDENT IN AN ORGANIZED HEALTH CARE EDUCATION/TRAINING PROGRAM

## 2024-02-20 PROCEDURE — 96375 TX/PRO/DX INJ NEW DRUG ADDON: CPT | Mod: XU | Performed by: STUDENT IN AN ORGANIZED HEALTH CARE EDUCATION/TRAINING PROGRAM

## 2024-02-20 PROCEDURE — P9016 RBC LEUKOCYTES REDUCED: HCPCS | Performed by: STUDENT IN AN ORGANIZED HEALTH CARE EDUCATION/TRAINING PROGRAM

## 2024-02-20 PROCEDURE — 82962 GLUCOSE BLOOD TEST: CPT

## 2024-02-20 PROCEDURE — 85025 COMPLETE CBC W/AUTO DIFF WBC: CPT | Performed by: STUDENT IN AN ORGANIZED HEALTH CARE EDUCATION/TRAINING PROGRAM

## 2024-02-20 PROCEDURE — 93005 ELECTROCARDIOGRAM TRACING: CPT | Mod: XU | Performed by: STUDENT IN AN ORGANIZED HEALTH CARE EDUCATION/TRAINING PROGRAM

## 2024-02-20 PROCEDURE — 258N000003 HC RX IP 258 OP 636: Performed by: STUDENT IN AN ORGANIZED HEALTH CARE EDUCATION/TRAINING PROGRAM

## 2024-02-20 PROCEDURE — 87205 SMEAR GRAM STAIN: CPT | Performed by: STUDENT IN AN ORGANIZED HEALTH CARE EDUCATION/TRAINING PROGRAM

## 2024-02-20 PROCEDURE — 96361 HYDRATE IV INFUSION ADD-ON: CPT | Mod: XS | Performed by: STUDENT IN AN ORGANIZED HEALTH CARE EDUCATION/TRAINING PROGRAM

## 2024-02-20 PROCEDURE — 82042 OTHER SOURCE ALBUMIN QUAN EA: CPT | Performed by: STUDENT IN AN ORGANIZED HEALTH CARE EDUCATION/TRAINING PROGRAM

## 2024-02-20 PROCEDURE — 250N000012 HC RX MED GY IP 250 OP 636 PS 637: Performed by: STUDENT IN AN ORGANIZED HEALTH CARE EDUCATION/TRAINING PROGRAM

## 2024-02-20 PROCEDURE — 86922 COMPATIBILITY TEST ANTIGLOB: CPT | Performed by: STUDENT IN AN ORGANIZED HEALTH CARE EDUCATION/TRAINING PROGRAM

## 2024-02-20 PROCEDURE — 89050 BODY FLUID CELL COUNT: CPT | Performed by: STUDENT IN AN ORGANIZED HEALTH CARE EDUCATION/TRAINING PROGRAM

## 2024-02-20 PROCEDURE — 84157 ASSAY OF PROTEIN OTHER: CPT | Performed by: STUDENT IN AN ORGANIZED HEALTH CARE EDUCATION/TRAINING PROGRAM

## 2024-02-20 PROCEDURE — 49083 ABD PARACENTESIS W/IMAGING: CPT | Performed by: STUDENT IN AN ORGANIZED HEALTH CARE EDUCATION/TRAINING PROGRAM

## 2024-02-20 PROCEDURE — 87040 BLOOD CULTURE FOR BACTERIA: CPT | Performed by: STUDENT IN AN ORGANIZED HEALTH CARE EDUCATION/TRAINING PROGRAM

## 2024-02-20 PROCEDURE — 250N000009 HC RX 250: Performed by: STUDENT IN AN ORGANIZED HEALTH CARE EDUCATION/TRAINING PROGRAM

## 2024-02-20 PROCEDURE — 86900 BLOOD TYPING SEROLOGIC ABO: CPT | Performed by: STUDENT IN AN ORGANIZED HEALTH CARE EDUCATION/TRAINING PROGRAM

## 2024-02-20 PROCEDURE — 36430 TRANSFUSION BLD/BLD COMPNT: CPT | Mod: XU | Performed by: STUDENT IN AN ORGANIZED HEALTH CARE EDUCATION/TRAINING PROGRAM

## 2024-02-20 PROCEDURE — 86870 RBC ANTIBODY IDENTIFICATION: CPT | Performed by: STUDENT IN AN ORGANIZED HEALTH CARE EDUCATION/TRAINING PROGRAM

## 2024-02-20 PROCEDURE — 258N000001 HC RX 258: Performed by: STUDENT IN AN ORGANIZED HEALTH CARE EDUCATION/TRAINING PROGRAM

## 2024-02-20 PROCEDURE — 96365 THER/PROPH/DIAG IV INF INIT: CPT | Mod: XU | Performed by: STUDENT IN AN ORGANIZED HEALTH CARE EDUCATION/TRAINING PROGRAM

## 2024-02-20 PROCEDURE — 85610 PROTHROMBIN TIME: CPT | Performed by: STUDENT IN AN ORGANIZED HEALTH CARE EDUCATION/TRAINING PROGRAM

## 2024-02-20 PROCEDURE — 80053 COMPREHEN METABOLIC PANEL: CPT | Performed by: STUDENT IN AN ORGANIZED HEALTH CARE EDUCATION/TRAINING PROGRAM

## 2024-02-20 PROCEDURE — 84132 ASSAY OF SERUM POTASSIUM: CPT | Mod: 91 | Performed by: STUDENT IN AN ORGANIZED HEALTH CARE EDUCATION/TRAINING PROGRAM

## 2024-02-20 PROCEDURE — 250N000011 HC RX IP 250 OP 636: Performed by: STUDENT IN AN ORGANIZED HEALTH CARE EDUCATION/TRAINING PROGRAM

## 2024-02-20 PROCEDURE — 96368 THER/DIAG CONCURRENT INF: CPT | Mod: XU | Performed by: STUDENT IN AN ORGANIZED HEALTH CARE EDUCATION/TRAINING PROGRAM

## 2024-02-20 PROCEDURE — 36415 COLL VENOUS BLD VENIPUNCTURE: CPT | Performed by: STUDENT IN AN ORGANIZED HEALTH CARE EDUCATION/TRAINING PROGRAM

## 2024-02-20 RX ORDER — CEFAZOLIN SODIUM 1 G/50ML
2000 SOLUTION INTRAVENOUS ONCE
Status: COMPLETED | OUTPATIENT
Start: 2024-02-20 | End: 2024-02-20

## 2024-02-20 RX ORDER — PIPERACILLIN SODIUM, TAZOBACTAM SODIUM 4; .5 G/20ML; G/20ML
4.5 INJECTION, POWDER, LYOPHILIZED, FOR SOLUTION INTRAVENOUS ONCE
Status: DISCONTINUED | OUTPATIENT
Start: 2024-02-20 | End: 2024-02-20

## 2024-02-20 RX ORDER — ALBUTEROL SULFATE 5 MG/ML
10 SOLUTION RESPIRATORY (INHALATION) ONCE
Status: DISCONTINUED | OUTPATIENT
Start: 2024-02-20 | End: 2024-02-20

## 2024-02-20 RX ORDER — FUROSEMIDE 10 MG/ML
20 INJECTION INTRAMUSCULAR; INTRAVENOUS ONCE
Status: COMPLETED | OUTPATIENT
Start: 2024-02-20 | End: 2024-02-20

## 2024-02-20 RX ORDER — DEXTROSE MONOHYDRATE 25 G/50ML
25-50 INJECTION, SOLUTION INTRAVENOUS
Status: DISCONTINUED | OUTPATIENT
Start: 2024-02-20 | End: 2024-02-20 | Stop reason: HOSPADM

## 2024-02-20 RX ORDER — CALCIUM GLUCONATE 94 MG/ML
1 INJECTION, SOLUTION INTRAVENOUS ONCE
Status: COMPLETED | OUTPATIENT
Start: 2024-02-20 | End: 2024-02-20

## 2024-02-20 RX ORDER — NICOTINE POLACRILEX 4 MG
15-30 LOZENGE BUCCAL
Status: DISCONTINUED | OUTPATIENT
Start: 2024-02-20 | End: 2024-02-20 | Stop reason: HOSPADM

## 2024-02-20 RX ORDER — ALBUTEROL SULFATE 0.83 MG/ML
10 SOLUTION RESPIRATORY (INHALATION) ONCE
Status: COMPLETED | OUTPATIENT
Start: 2024-02-20 | End: 2024-02-20

## 2024-02-20 RX ORDER — METRONIDAZOLE 500 MG/100ML
500 INJECTION, SOLUTION INTRAVENOUS ONCE
Status: COMPLETED | OUTPATIENT
Start: 2024-02-20 | End: 2024-02-20

## 2024-02-20 RX ORDER — DEXTROSE MONOHYDRATE 25 G/50ML
25 INJECTION, SOLUTION INTRAVENOUS ONCE
Status: COMPLETED | OUTPATIENT
Start: 2024-02-20 | End: 2024-02-20

## 2024-02-20 RX ORDER — CEFEPIME HYDROCHLORIDE 2 G/1
2 INJECTION, POWDER, FOR SOLUTION INTRAVENOUS ONCE
Status: COMPLETED | OUTPATIENT
Start: 2024-02-20 | End: 2024-02-20

## 2024-02-20 RX ADMIN — CEFEPIME 2 G: 2 INJECTION, POWDER, FOR SOLUTION INTRAVENOUS at 14:42

## 2024-02-20 RX ADMIN — DEXTROSE MONOHYDRATE 25 G: 25 INJECTION, SOLUTION INTRAVENOUS at 14:37

## 2024-02-20 RX ADMIN — DEXTROSE MONOHYDRATE 300 ML: 10 INJECTION, SOLUTION INTRAVENOUS at 15:03

## 2024-02-20 RX ADMIN — METRONIDAZOLE 500 MG: 500 INJECTION, SOLUTION INTRAVENOUS at 14:45

## 2024-02-20 RX ADMIN — VANCOMYCIN HYDROCHLORIDE 2000 MG: 10 INJECTION, POWDER, LYOPHILIZED, FOR SOLUTION INTRAVENOUS at 15:45

## 2024-02-20 RX ADMIN — FUROSEMIDE 20 MG: 10 INJECTION, SOLUTION INTRAVENOUS at 14:36

## 2024-02-20 RX ADMIN — ALBUTEROL SULFATE 10 MG: 2.5 SOLUTION RESPIRATORY (INHALATION) at 14:57

## 2024-02-20 RX ADMIN — INSULIN HUMAN 8 UNITS: 100 INJECTION, SOLUTION PARENTERAL at 14:37

## 2024-02-20 RX ADMIN — CALCIUM GLUCONATE 1 G: 98 INJECTION, SOLUTION INTRAVENOUS at 16:43

## 2024-02-20 RX ADMIN — PROCHLORPERAZINE EDISYLATE 5 MG: 5 INJECTION INTRAMUSCULAR; INTRAVENOUS at 14:35

## 2024-02-20 ASSESSMENT — ACTIVITIES OF DAILY LIVING (ADL)
ADLS_ACUITY_SCORE: 35

## 2024-02-20 NOTE — ED TRIAGE NOTES
Patient was at the [facility] and was told he has a low hgb.  Unsure what the hgb level is.  Hx of liver disease and has been receiving paracentesis for this.  Next one is scheduled on Thursday.  He reports dizziness when getting up and nausea.  Last took a zofran.  Not on blood thinners.  Reports lots of blood in stool that is bright red.

## 2024-02-20 NOTE — ED PROVIDER NOTES
History     Chief Complaint   Patient presents with    Rectal Bleeding    Dizziness    Low Hemoglobin       Marlon Guy is a 53 year old male who presents with bright red blood per rectum.  Noticed over the past day.  He has had a couple episodes of bright red blood from his rectum.  Denies any pain with defecation.  He feels lightheaded when he stands up but not currently lightheaded laying down.  He has a history of blood per rectum in the past.  He also reports abdominal discomfort due to his paracentesis that was due yesterday being canceled due to the holiday.  He typically gets paracentesis weekly.  He is unclear if he will be able to get in later this week to get this done.  He has chronic bilateral lower extremity edema secondary to liver failure.  He [facility] where low hemoglobin was discovered.  Patient does not know what value this was but states he has been down to 5 in the past.  He has also been dealing with leaking ascites from his right lower quadrant and has been wearing an absorbent pad since his last paracentesis over a week ago.    Allergies   Allergen Reactions    Amoxicillin Rash       Patient Active Problem List    Diagnosis Date Noted    Cellulitis of right lower extremity 11/10/2022     Priority: Medium    Leg edema, right 11/10/2022     Priority: Medium    Hyponatremia 11/10/2022     Priority: Medium    Alcohol abuse 11/10/2022     Priority: Medium    Other specified anemias 11/10/2022     Priority: Medium    Portal hypertension (H) 11/10/2022     Priority: Medium    Acquired trigger finger 06/16/2020     Priority: Medium     Jun 22, 2017 Entered By: ALESSANDRA ALDRICH Comment: left handed      Anxiety 06/16/2020     Priority: Medium    Erectile dysfunction 06/16/2020     Priority: Medium    Insomnia 06/16/2020     Priority: Medium    Moderate alcohol dependence (H) 06/16/2020     Priority: Medium    Obesity 06/16/2020     Priority: Medium    Tinnitus  06/16/2020     Priority: Medium    Hyphema, right eye 06/07/2017     Priority: Medium    Alcoholic fatty liver 12/14/2012     Priority: Medium    Abnormal LFTs 12/13/2012     Priority: Medium    Acute alcoholic pancreatitis 12/12/2012     Priority: Medium    Pulmonary nodule 12/12/2012     Priority: Medium     5 mm possible nodule, right middle lobe seen on CT abd done for   pancreatitis      Cyst of skin 05/31/2012     Priority: Medium    Internal hemorrhoids 01/19/2012     Priority: Medium    Obstructive sleep apnea 07/09/2010     Priority: Medium     Setting: CPAP 12 cmH20 Transcend   Supplied by: [facility]  PSG done:  8-5-10  AHI  16  RDI  23  Lowest O2 Sat:  86%  ; Moderate Obstructive sleep apnea      Esophageal reflux 03/27/2008     Priority: Medium     EGD findings 5/30/2008:   Impression: - LA Grade C reflux esophagitis.   - Gaping lower esophageal sphincter.   - Non-bleeding erosive gastropathy.   - Gastric mucosal abnormality   characterized by erythema.   Recommendation: - Follow an antireflux regimen   indefinitely.   - Use Prilosec (omeprazole) at 20 mg PO   BID indefinitely.  ; Gastroesophageal Reflux Disease      Rosacea 03/27/2008     Priority: Medium     Rosacea         Past Medical History:   Diagnosis Date    Anemia        Past Surgical History:   Procedure Laterality Date    ORTHOPEDIC SURGERY         History reviewed. No pertinent family history.    Social History     Tobacco Use    Smoking status: Former     Types: Cigarettes    Smokeless tobacco: Current     Types: Chew   Substance Use Topics    Alcohol use: Yes     Alcohol/week: 50.0 standard drinks of alcohol     Types: 50 Cans of beer per week     Comment: daily    Drug use: Never       Medications:    cyclobenzaprine (FLEXERIL) 10 MG tablet  ferrous gluconate (FERGON) 324 (38 Fe) MG tablet  folic acid (FOLVITE) 1 MG tablet  gabapentin (NEURONTIN) 100 MG capsule  HYDROcodone-acetaminophen (NORCO) 5-325 MG tablet  pantoprazole (PROTONIX) 40 MG  "EC tablet  propranolol (INDERAL) 20 MG tablet  sucralfate (CARAFATE) 1 GM tablet  sulfamethoxazole-trimethoprim (BACTRIM DS) 800-160 MG tablet  thiamine (B-1) 100 MG tablet  venlafaxine (EFFEXOR XR) 37.5 MG 24 hr capsule  vitamin C (ASCORBIC ACID) 500 MG tablet        Review of Systems: See HPI for pertinent negatives and positives. All other systems reviewed and found to be negative.    Physical Exam   /54   Pulse 107   Temp 98.9  F (37.2  C) (Tympanic)   Resp 12   Ht 1.651 m (5' 5\")   Wt 81.6 kg (180 lb)   SpO2 99%   BMI 29.95 kg/m       General: awake, comfortable  HEENT: atraumatic  Respiratory: normal effort, clear to auscultation bilaterally  Cardiovascular: regular rate and rhythm, systolic murmur  Abdomen: soft, nontender, severely distended, absorbent padding taped over right lower quadrant  Extremities: no deformities, tenderness, chronic 2+ pitting edema bilaterally  Skin: Jaundiced, dry, no rashes  Neuro: alert, no focal deficits  Psych: appropriate mood and affect    ED Course        Results for orders placed or performed during the hospital encounter of 02/20/24 (from the past 24 hour(s))   CBC with platelets differential    Narrative    The following orders were created for panel order CBC with platelets differential.  Procedure                               Abnormality         Status                     ---------                               -----------         ------                     CBC with platelets and d...[[id number]]  Abnormal            Final result                 Please view results for these tests on the individual orders.   ABO/Rh type and screen    Narrative    The following orders were created for panel order ABO/Rh type and screen.  Procedure                               Abnormality         Status                     ---------                               -----------         ------                     Adult Type and Screen[[id number]]        Abnormal            Edited " Result - FINAL        Please view results for these tests on the individual orders.   [place] Draw    Narrative    The following orders were created for panel order [place] Draw.  Procedure                               Abnormality         Status                     ---------                               -----------         ------                     Extra Blue Top Tube[[id number]]                              Final result               Extra Red Top Tube[[id number]]                               Final result               Extra Green Top (Lithium...[[id number]]                      Final result               Extra Purple Top Tube[[id number]]                            Final result               Extra Green Top (Lithium...[[id number]]                      Final result                 Please view results for these tests on the individual orders.   Comprehensive metabolic panel   Result Value Ref Range    Sodium 119 (LL) 135 - 145 mmol/L    Potassium 6.8 (HH) 3.4 - 5.3 mmol/L    Carbon Dioxide (CO2) 17 (L) 22 - 29 mmol/L    Anion Gap 13 7 - 15 mmol/L    Urea Nitrogen 91.3 (H) 6.0 - 20.0 mg/dL    Creatinine 2.44 (H) 0.67 - 1.17 mg/dL    GFR Estimate 31 (L) >60 mL/min/1.73m2    Calcium 8.8 8.6 - 10.0 mg/dL    Chloride 89 (L) 98 - 107 mmol/L    Glucose 183 (H) 70 - 99 mg/dL    Alkaline Phosphatase 113 40 - 150 U/L    AST 59 (H) 0 - 45 U/L    ALT 48 0 - 70 U/L    Protein Total 4.5 (L) 6.4 - 8.3 g/dL    Albumin 3.0 (L) 3.5 - 5.2 g/dL    Bilirubin Total 7.3 (H) <=1.2 mg/dL   CBC with platelets and differential   Result Value Ref Range    WBC Count 34.2 (H) 4.0 - 11.0 10e3/uL    RBC Count 1.63 (L) 4.40 - 5.90 10e6/uL    Hemoglobin 5.7 (LL) 13.3 - 17.7 g/dL    Hematocrit 15.9 (L) 40.0 - 53.0 %    MCV 98 78 - 100 fL    MCH 35.0 (H) 26.5 - 33.0 pg    MCHC 35.8 31.5 - 36.5 g/dL    RDW 22.0 (H) 10.0 - 15.0 %    Platelet Count 86 (L) 150 - 450 10e3/uL    % Neutrophils 91 %    % Lymphocytes 2 %    % Monocytes 4 %    % Eosinophils 0 %     % Basophils 0 %    % Immature Granulocytes 3 %    NRBCs per 100 WBC 0 <1 /100    Absolute Neutrophils 30.9 (H) 1.6 - 8.3 10e3/uL    Absolute Lymphocytes 0.8 0.8 - 5.3 10e3/uL    Absolute Monocytes 1.5 (H) 0.0 - 1.3 10e3/uL    Absolute Eosinophils 0.0 0.0 - 0.7 10e3/uL    Absolute Basophils 0.1 0.0 - 0.2 10e3/uL    Absolute Immature Granulocytes 0.9 (H) <=0.4 10e3/uL    Absolute NRBCs 0.0 10e3/uL   Adult Type and Screen   Result Value Ref Range    ABO/RH(D) A POS     Antibody Screen Positive (A) Negative    SPECIMEN EXPIRATION DATE [id number]    Extra Blue Top Tube   Result Value Ref Range    Hold Specimen JIC    Extra Red Top Tube   Result Value Ref Range    Hold Specimen JIC    Extra Green Top (Lithium Heparin) Tube   Result Value Ref Range    Hold Specimen JIC    Extra Purple Top Tube   Result Value Ref Range    Hold Specimen JIC    Extra Green Top (Lithium Heparin) ON ICE   Result Value Ref Range    Hold Specimen JIC    Extra Tube    Narrative    The following orders were created for panel order Extra Tube.  Procedure                               Abnormality         Status                     ---------                               -----------         ------                     Extra Blue Top Tube[[id number]]                                                         Extra Green Top (Lithium...[[id number]]                      Final result                 Please view results for these tests on the individual orders.   Extra Green Top (Lithium Heparin) Tube   Result Value Ref Range    Hold Specimen JIC    INR   Result Value Ref Range    INR 1.68 (H) 0.85 - 1.15   Antibody identification   Result Value Ref Range    Antibody Identification Anti-E     SPECIMEN EXPIRATION DATE [id number]    Albumin level   Result Value Ref Range    Albumin 3.0 (L) 3.5 - 5.2 g/dL   Prepare red blood cells (unit)   Result Value Ref Range    Blood Component Type Red Blood Cells     Product Code [id number]     Unit Status Issued      Unit Number [id number]     CROSSMATCH COMPATIBLE     CODING SYSTEM [id number]     ISSUE DATE AND TIME [id number]     UNIT ABO/RH A+     UNIT TYPE ISBT 6200    Prepare red blood cells (unit)   Result Value Ref Range    Blood Component Type Red Blood Cells     Product Code B9816F62     Unit Status Issued     Unit Number [id number]     CROSSMATCH COMPATIBLE     CODING SYSTEM [id number]     ISSUE DATE AND TIME [id number]     UNIT ABO/RH A+     UNIT TYPE ISBT 6200    Cell count with differential fluid    Narrative    The following orders were created for panel order Cell count with differential fluid.  Procedure                               Abnormality         Status                     ---------                               -----------         ------                     Cell Count Body Fluid[[id number]]        Abnormal            Final result               Differential Body Fluid[[id number]]                          Final result                 Please view results for these tests on the individual orders.   Cell Count Body Fluid   Result Value Ref Range    Color Yellow Colorless, Yellow    Clarity Hazy (A) Clear    RBC Count <2,000 /uL    Cell Count Fluid Source Abdomen     Total Nucleated Cells 118 /uL    Narrative    No reference ranges have been established.  This result  should be interpreted in the context of the patient's clinical condition and   compared to simultaneous measurement in the patient's blood.         Differential Body Fluid   Result Value Ref Range    % Polynuclear Cells, Body Fluid 13 %    % Mononuclear Cells, Body Fluid 87 %    Absolute Polynuclear Cells 15.3   /uL    Narrative    No reference ranges have been established. This result should be interpreted in the context of the patient's clinical condition and compared to simultaneous measurement in the patient's blood.   Glucose by meter   Result Value Ref Range    GLUCOSE BY METER POCT 142 (H) 70 - 99 mg/dL   Potassium   Result Value Ref  Range    Potassium 6.4 (HH) 3.4 - 5.3 mmol/L       Medications   glucose gel 15-30 g (has no administration in time range)     Or   dextrose 50 % injection 25-50 mL (has no administration in time range)     Or   glucagon injection 1 mg (has no administration in time range)   dextrose 10% BOLUS 300 mL (300 mLs Intravenous $New Bag 2/20/24 1503)   vancomycin (VANCOCIN) 2,000 mg in sodium chloride 0.9 % 500 mL intermittent infusion (2,000 mg Intravenous $New Bag 2/20/24 1545)   ceFEPIme (MAXIPIME) 2 g vial to attach to  mL bag for ADULTS or 50 mL bag for PEDS (0 g Intravenous Stopped 2/20/24 1527)   metroNIDAZOLE (FLAGYL) infusion 500 mg (0 mg Intravenous Stopped 2/20/24 1545)   dextrose 50 % injection 25 g (25 g Intravenous $Given 2/20/24 1437)   insulin (regular) (HumuLIN R/NovoLIN R) injection 8 Units (8 Units Intravenous $Given 2/20/24 1437)   furosemide (LASIX) injection 20 mg (20 mg Intravenous $Given 2/20/24 1436)   prochlorperazine (COMPAZINE) injection 5 mg (5 mg Intravenous $Given 2/20/24 1435)   albuterol (PROVENTIL) neb solution 10 mg (10 mg Nebulization $Given 2/20/24 1457)   calcium gluconate 10 % injection 1 g (0 g Intravenous Stopped 2/20/24 1644)     [facility]    -Paracentesis    Date/Time: 2/20/2024 4:47 PM    Performed by: Raghu Liz MD  Authorized by: Raghu Liz MD    Emergent condition/consent implied      PRE-PROCEDURE DETAILS     Procedure purpose:  Diagnostic    ANESTHESIA (see MAR for exact dosages):     Anesthesia method:  Local infiltration    Local anesthetic:  Lidocaine 1% w/o epi    PROCEDURE DETAILS     Ultrasound guidance: yes      Puncture site:  R lower quadrant    Fluid removed amount:  ~4.5 L    Fluid appearance:  Stacie (Hazy)    Dressing:  Adhesive bandage      PROCEDURE    Patient Tolerance:  Patient tolerated the procedure well with no immediate complications       Assessments & Plan (with Medical Decision Making)     I have reviewed the  nursing notes.    ED Course as of 02/20/24 1646   e Feb 20, 2024   1405 Sodium(!!): 119   1405 Potassium(!!): 6.8  This was rerun making hemolyzed sample unlikely.   1406 Hemoglobin(!!): 5.7   1412 WBC(!): 34.2   1450 Waiting to hear back from [facility]   1518 [facility] GI Dr Samuel ROBERT with transfer. [facility] hospitalist Dr. Gunderson   1537 On [facility] transplant list.   1548 Accepted by Dr Gunderson - awaiting bed availability   1611 EKG: sinus tachycardia 103, nonischemic with no ST abnormalities, LBBB, I/II/V3-6 TWI.  Normal intervals.  No peaked T waves.        53 year old male evaluated [facility] in context of bloody stools over the past day.  Hemodynamically stable and afebrile.  Exam with jaundiced male with ascites, 2+ pitting edema in lower extremities and bandaged right lower quadrant paracentesis site from approximately 1 week ago that has been slowly leaking per patient over the past week.  Labs remarkable for hemoglobin 5.7, sodium 119, potassium 6.8, leukocytosis 34.  Given 2 U PRBC.  With concern for SBP with leukocytosis, paracentesis performed with 4.5 L of hazy mitch-colored removed.  He was treated for hyperkalemia and broad-spectrum antibiotics were given including vancomycin with history of MRSA. Blood cultures pending.  Fluid sample comes back with less than 250 PMNs  (118).  Patient has not made urine yet.  No respiratory symptoms.  Potassium improved to 6.4. There is no further lower GI blood loss noted and patient remained hemodynamically stable.  Patient was given calcium prior to transport.  Accepted per above ED course by Dr. Gunderson.    I have reviewed the findings, diagnosis, and plan with the patient.    Critical care time:  90 minutes of critical care time was spent with this patient, exclusive of all other separately billable services, including EKG/labs, managing acute condition, and communications with specialists/hospitalists.      New Prescriptions    No medications on file       Final diagnoses:   Anemia, unspecified type - 5.7   Hyperkalemia   Hyponatremia   ELIZABETH (acute kidney injury) (H24)   Bandemia       2/20/2024   [facility] AND [facility]     Raghu Liz MD  02/20/24 5448

## 2024-02-20 NOTE — PHARMACY-VANCOMYCIN DOSING SERVICE
Pharmacy Vancomycin Initial Note  Date of Service 2024  Patient's  1970  53 year old, male    Indication: Sepsis and Urinary Tract Infection    Current estimated CrCl = Estimated Creatinine Clearance: 35.2 mL/min (A) (based on SCr of 2.44 mg/dL (H)).    Creatinine for last 3 days  2024:  1:15 PM Creatinine 2.44 mg/dL    Recent Vancomycin Level(s) for last 3 days  No results found for requested labs within last 3 days.      Vancomycin IV Administrations (past 72 hours)        No vancomycin orders with administrations in past 72 hours.                    Nephrotoxins and other renal medications (From now, onward)      Start     Dose/Rate Route Frequency Ordered Stop    24 1420  vancomycin (VANCOCIN) 2,000 mg in sodium chloride 0.9 % 500 mL intermittent infusion         2,000 mg  over 2 Hours Intravenous ONCE 24 1431      24 1415  furosemide (LASIX) injection 20 mg         20 mg  over 1-3 Minutes Intravenous ONCE 24 1412              Contrast Orders - past 72 hours (72h ago, onward)      None                    Plan:  Start vancomycin  2000 mg IV Once.   Vancomycin monitoring method: AUC  Vancomycin therapeutic monitoring goal: 400-600 mg*h/L  Pharmacy will check vancomycin levels as appropriate in 1-3 Days.    Serum creatinine levels will be ordered daily for the first week of therapy and at least twice weekly for subsequent weeks.      Elias Grande, [facility]

## 2024-02-21 LAB
ALBUMIN BODY FLUID SOURCE: NORMAL
ALBUMIN FLD-MCNC: 0.4 G/DL
PROT FLD-MCNC: 0.6 G/DL
PROTEIN BODY FLUID SOURCE: NORMAL

## 2024-02-25 LAB
BACTERIA BLD CULT: NO GROWTH
BACTERIA BLD CULT: NO GROWTH

## 2024-02-26 LAB
BACTERIA FLD CULT: NO GROWTH
GRAM STAIN RESULT: NORMAL
GRAM STAIN RESULT: NORMAL

## 2024-09-12 ENCOUNTER — LAB REQUISITION (OUTPATIENT)
Dept: LAB | Facility: CLINIC | Age: 54
End: 2024-09-12

## 2024-09-12 LAB
TACROLIMUS BLD-MCNC: 4.8 UG/L (ref 5–15)
TME LAST DOSE: ABNORMAL H
TME LAST DOSE: ABNORMAL H

## 2024-09-12 PROCEDURE — 80197 ASSAY OF TACROLIMUS: CPT

## 2024-09-16 ENCOUNTER — LAB REQUISITION (OUTPATIENT)
Dept: LAB | Facility: CLINIC | Age: 54
End: 2024-09-16

## 2024-09-16 LAB
TACROLIMUS BLD-MCNC: 5.5 UG/L (ref 5–15)
TME LAST DOSE: NORMAL H
TME LAST DOSE: NORMAL H

## 2024-09-16 PROCEDURE — 80197 ASSAY OF TACROLIMUS: CPT

## 2024-09-17 LAB — CMV DNA SPEC NAA+PROBE-ACNC: NOT DETECTED IU/ML

## 2024-09-19 ENCOUNTER — LAB REQUISITION (OUTPATIENT)
Dept: LAB | Facility: CLINIC | Age: 54
End: 2024-09-19

## 2024-09-19 LAB
TACROLIMUS BLD-MCNC: 5.5 UG/L (ref 5–15)
TME LAST DOSE: NORMAL H
TME LAST DOSE: NORMAL H

## 2024-09-19 PROCEDURE — 80197 ASSAY OF TACROLIMUS: CPT

## 2024-09-24 ENCOUNTER — LAB REQUISITION (OUTPATIENT)
Dept: LAB | Facility: CLINIC | Age: 54
End: 2024-09-24

## 2024-09-24 LAB
TACROLIMUS BLD-MCNC: 7.3 UG/L (ref 5–15)
TME LAST DOSE: NORMAL H
TME LAST DOSE: NORMAL H

## 2024-09-24 PROCEDURE — 80197 ASSAY OF TACROLIMUS: CPT

## 2024-09-25 LAB — CMV DNA SPEC NAA+PROBE-ACNC: NOT DETECTED IU/ML

## 2024-09-30 ENCOUNTER — LAB REQUISITION (OUTPATIENT)
Dept: LAB | Facility: CLINIC | Age: 54
End: 2024-09-30

## 2024-09-30 LAB
TACROLIMUS BLD-MCNC: 3.7 UG/L (ref 5–15)
TME LAST DOSE: ABNORMAL H
TME LAST DOSE: ABNORMAL H

## 2024-09-30 PROCEDURE — 80197 ASSAY OF TACROLIMUS: CPT

## 2024-10-03 ENCOUNTER — LAB REQUISITION (OUTPATIENT)
Dept: LAB | Facility: CLINIC | Age: 54
End: 2024-10-03

## 2024-10-03 PROCEDURE — 80197 ASSAY OF TACROLIMUS: CPT

## 2024-10-04 LAB
TACROLIMUS BLD-MCNC: 3.2 UG/L (ref 5–15)
TME LAST DOSE: ABNORMAL H
TME LAST DOSE: ABNORMAL H

## 2024-10-07 ENCOUNTER — LAB REQUISITION (OUTPATIENT)
Dept: LAB | Facility: CLINIC | Age: 54
End: 2024-10-07

## 2024-10-07 PROCEDURE — 80197 ASSAY OF TACROLIMUS: CPT

## 2024-10-08 LAB
CMV DNA SPEC NAA+PROBE-ACNC: <35 IU/ML
CMV DNA SPEC NAA+PROBE-LOG#: <1.5 {LOG_COPIES}/ML
TACROLIMUS BLD-MCNC: 5.8 UG/L (ref 5–15)
TME LAST DOSE: NORMAL H
TME LAST DOSE: NORMAL H

## 2024-10-10 ENCOUNTER — LAB REQUISITION (OUTPATIENT)
Dept: LAB | Facility: CLINIC | Age: 54
End: 2024-10-10

## 2024-10-10 LAB
TACROLIMUS BLD-MCNC: 8 UG/L (ref 5–15)
TME LAST DOSE: NORMAL H
TME LAST DOSE: NORMAL H

## 2024-10-10 PROCEDURE — 80197 ASSAY OF TACROLIMUS: CPT

## 2024-10-15 ENCOUNTER — LAB REQUISITION (OUTPATIENT)
Dept: LAB | Facility: CLINIC | Age: 54
End: 2024-10-15

## 2024-10-15 PROCEDURE — 80197 ASSAY OF TACROLIMUS: CPT

## 2024-10-16 LAB
CMV DNA SPEC NAA+PROBE-ACNC: 305 IU/ML
CMV DNA SPEC NAA+PROBE-LOG#: 2.5 {LOG_COPIES}/ML
TACROLIMUS BLD-MCNC: 11.5 UG/L (ref 5–15)
TME LAST DOSE: NORMAL H
TME LAST DOSE: NORMAL H

## 2024-10-24 ENCOUNTER — LAB REQUISITION (OUTPATIENT)
Dept: LAB | Facility: CLINIC | Age: 54
End: 2024-10-24

## 2024-10-24 LAB
TACROLIMUS BLD-MCNC: 7.7 UG/L (ref 5–15)
TME LAST DOSE: NORMAL H
TME LAST DOSE: NORMAL H

## 2024-10-24 PROCEDURE — 80197 ASSAY OF TACROLIMUS: CPT

## 2024-10-25 LAB
CMV DNA SPEC NAA+PROBE-ACNC: 1140 IU/ML
CMV DNA SPEC NAA+PROBE-LOG#: 3.1 {LOG_COPIES}/ML

## 2024-10-30 ENCOUNTER — LAB REQUISITION (OUTPATIENT)
Dept: LAB | Facility: CLINIC | Age: 54
End: 2024-10-30

## 2024-10-31 LAB
CMV DNA SPEC NAA+PROBE-ACNC: 1460 IU/ML
CMV DNA SPEC NAA+PROBE-LOG#: 3.2 {LOG_COPIES}/ML

## 2024-11-19 ENCOUNTER — LAB REQUISITION (OUTPATIENT)
Dept: LAB | Facility: CLINIC | Age: 54
End: 2024-11-19

## 2024-11-19 PROCEDURE — 80197 ASSAY OF TACROLIMUS: CPT

## 2024-11-20 LAB
CMV DNA SPEC NAA+PROBE-ACNC: <35 IU/ML
CMV DNA SPEC NAA+PROBE-LOG#: <1.5 {LOG_COPIES}/ML
SPECIMEN TYPE: ABNORMAL
TACROLIMUS BLD-MCNC: 10.7 UG/L (ref 5–15)
TME LAST DOSE: NORMAL H
TME LAST DOSE: NORMAL H

## 2024-11-25 ENCOUNTER — LAB REQUISITION (OUTPATIENT)
Dept: LAB | Facility: CLINIC | Age: 54
End: 2024-11-25

## 2024-11-25 PROCEDURE — 80197 ASSAY OF TACROLIMUS: CPT

## 2024-11-26 LAB
CMV DNA SPEC NAA+PROBE-ACNC: NOT DETECTED IU/ML
SPECIMEN TYPE: NORMAL
TACROLIMUS BLD-MCNC: 8.8 UG/L (ref 5–15)
TME LAST DOSE: NORMAL H
TME LAST DOSE: NORMAL H

## 2024-11-29 ENCOUNTER — LAB REQUISITION (OUTPATIENT)
Dept: LAB | Facility: CLINIC | Age: 54
End: 2024-11-29

## 2024-11-30 LAB
CMV DNA SPEC NAA+PROBE-ACNC: NOT DETECTED IU/ML
SPECIMEN TYPE: NORMAL

## 2024-12-02 ENCOUNTER — LAB REQUISITION (OUTPATIENT)
Dept: LAB | Facility: CLINIC | Age: 54
End: 2024-12-02

## 2024-12-02 LAB
TACROLIMUS BLD-MCNC: 10.2 UG/L (ref 5–15)
TME LAST DOSE: NORMAL H
TME LAST DOSE: NORMAL H

## 2024-12-02 PROCEDURE — 80197 ASSAY OF TACROLIMUS: CPT

## 2024-12-03 LAB
CMV DNA SPEC NAA+PROBE-ACNC: NOT DETECTED IU/ML
SPECIMEN TYPE: NORMAL

## 2024-12-04 NOTE — PHARMACY-MEDICATION REGIMEN REVIEW
Pharmacy Antimicrobial Stewardship Assessment     Current Antimicrobial Therapy:  Anti-infectives (From now, onward)    Start     Dose/Rate Route Frequency Ordered Stop    22 1300  cefTRIAXone in d5w (ROCEPHIN) intermittent infusion 1 g         1 g  over 30 Minutes Intravenous EVERY 24 HOURS 11/10/22 2102      22 0600  cephALEXin (KEFLEX) capsule 500 mg         500 mg Oral EVERY 6 HOURS SCHEDULED 11/10/22 2154            Indication: Cellulitis    Days of Therapy: 2     Pertinent Labs:  Recent Labs   Lab Test 22  0515 11/10/22  1254 22  1244   WBC 5.3 5.9 17.4*     Recent Labs   Lab Test 22  0515 11/10/22  2219 11/10/22  1316 11/10/22  1254 11/10/22  0054 22  1244   LACT  --  1.5  --  2.0  --   --    CRP 21.73*  --   --  26.43*  --  103.67*   SED  --   --  86*  --   --  37*   PCAL  --   --   --  0.12* 0.14*  --         Temperature:  Temp (24hrs), Av.8  F (36.6  C), Min:97.6  F (36.4  C), Max:98  F (36.7  C)    Culture Results:   (11/10/22) COVID/Influenza/RSV = negative  (11/10/22) Blood    Recent Abx:  22: Clindamycin course prescribed    Recommendations/Interventions:  1. Both Rocephin & Keflex ordered. Discontinue Rocephin; dual cephalosporin therapy not indicated.    Pancho Parra, [facility]  2022     show

## 2024-12-09 ENCOUNTER — LAB REQUISITION (OUTPATIENT)
Dept: LAB | Facility: CLINIC | Age: 54
End: 2024-12-09

## 2024-12-09 PROCEDURE — 80197 ASSAY OF TACROLIMUS: CPT

## 2024-12-10 LAB
CMV DNA SPEC NAA+PROBE-ACNC: NOT DETECTED IU/ML
SPECIMEN TYPE: NORMAL

## 2024-12-11 LAB
TACROLIMUS BLD-MCNC: 8.5 UG/L (ref 5–15)
TME LAST DOSE: NORMAL H
TME LAST DOSE: NORMAL H

## 2024-12-16 ENCOUNTER — LAB REQUISITION (OUTPATIENT)
Dept: LAB | Facility: CLINIC | Age: 54
End: 2024-12-16

## 2024-12-16 LAB
TACROLIMUS BLD-MCNC: 6.6 UG/L (ref 5–15)
TME LAST DOSE: NORMAL H
TME LAST DOSE: NORMAL H

## 2024-12-16 PROCEDURE — 80197 ASSAY OF TACROLIMUS: CPT

## 2024-12-17 LAB
CMV DNA SPEC NAA+PROBE-ACNC: NOT DETECTED IU/ML
SPECIMEN TYPE: NORMAL

## 2024-12-24 ENCOUNTER — LAB REQUISITION (OUTPATIENT)
Dept: LAB | Facility: CLINIC | Age: 54
End: 2024-12-24

## 2024-12-24 LAB
TACROLIMUS BLD-MCNC: 7.9 UG/L (ref 5–15)
TME LAST DOSE: NORMAL H
TME LAST DOSE: NORMAL H

## 2024-12-24 PROCEDURE — 80197 ASSAY OF TACROLIMUS: CPT

## 2024-12-25 LAB
CMV DNA SPEC NAA+PROBE-ACNC: NOT DETECTED IU/ML
SPECIMEN TYPE: NORMAL

## 2025-01-07 ENCOUNTER — LAB REQUISITION (OUTPATIENT)
Dept: LAB | Facility: CLINIC | Age: 55
End: 2025-01-07

## 2025-01-07 LAB
TACROLIMUS BLD-MCNC: 8.1 UG/L (ref 5–15)
TME LAST DOSE: NORMAL H
TME LAST DOSE: NORMAL H

## 2025-01-07 PROCEDURE — 80197 ASSAY OF TACROLIMUS: CPT

## 2025-01-08 LAB
CMV DNA SPEC NAA+PROBE-ACNC: NOT DETECTED IU/ML
SPECIMEN TYPE: NORMAL

## 2025-01-10 ENCOUNTER — LAB REQUISITION (OUTPATIENT)
Dept: LAB | Facility: CLINIC | Age: 55
End: 2025-01-10

## 2025-01-11 LAB
CMV DNA SPEC NAA+PROBE-ACNC: NOT DETECTED IU/ML
SPECIMEN TYPE: NORMAL

## 2025-02-07 ENCOUNTER — LAB REQUISITION (OUTPATIENT)
Dept: LAB | Facility: CLINIC | Age: 55
End: 2025-02-07

## 2025-02-08 LAB
CMV DNA SPEC NAA+PROBE-ACNC: 42 IU/ML
CMV DNA SPEC NAA+PROBE-LOG#: 1.6 {LOG_COPIES}/ML
SPECIMEN TYPE: ABNORMAL

## 2025-02-14 ENCOUNTER — LAB REQUISITION (OUTPATIENT)
Dept: LAB | Facility: CLINIC | Age: 55
End: 2025-02-14

## 2025-02-14 PROCEDURE — 80197 ASSAY OF TACROLIMUS: CPT

## 2025-02-15 LAB
CMV DNA SPEC NAA+PROBE-ACNC: <35 IU/ML
CMV DNA SPEC NAA+PROBE-LOG#: <1.5 {LOG_COPIES}/ML
SPECIMEN TYPE: ABNORMAL

## 2025-02-18 ENCOUNTER — LAB REQUISITION (OUTPATIENT)
Dept: LAB | Facility: CLINIC | Age: 55
End: 2025-02-18

## 2025-02-18 LAB
TACROLIMUS BLD-MCNC: 5.2 UG/L (ref 5–15)
TME LAST DOSE: NORMAL H
TME LAST DOSE: NORMAL H

## 2025-04-01 ENCOUNTER — LAB REQUISITION (OUTPATIENT)
Dept: LAB | Facility: CLINIC | Age: 55
End: 2025-04-01

## 2025-04-01 LAB
TACROLIMUS BLD-MCNC: 5.3 UG/L (ref 5–15)
TME LAST DOSE: NORMAL H
TME LAST DOSE: NORMAL H

## 2025-04-01 PROCEDURE — 80197 ASSAY OF TACROLIMUS: CPT

## 2025-04-02 LAB
CMV DNA SPEC NAA+PROBE-ACNC: NOT DETECTED IU/ML
SPECIMEN TYPE: NORMAL

## 2025-05-13 ENCOUNTER — LAB REQUISITION (OUTPATIENT)
Dept: LAB | Facility: CLINIC | Age: 55
End: 2025-05-13

## 2025-05-13 PROCEDURE — 80197 ASSAY OF TACROLIMUS: CPT

## 2025-05-14 LAB
CMV DNA SPEC NAA+PROBE-ACNC: NOT DETECTED IU/ML
SPECIMEN TYPE: NORMAL
TACROLIMUS BLD-MCNC: 4.6 UG/L (ref 5–15)
TME LAST DOSE: ABNORMAL H
TME LAST DOSE: ABNORMAL H

## 2025-07-02 ENCOUNTER — LAB REQUISITION (OUTPATIENT)
Dept: LAB | Facility: CLINIC | Age: 55
End: 2025-07-02

## 2025-07-02 PROCEDURE — 80197 ASSAY OF TACROLIMUS: CPT

## 2025-07-03 LAB
CMV DNA SPEC NAA+PROBE-ACNC: 46 IU/ML (ref ?–1)
CMV DNA SPEC NAA+PROBE-LOG#: 1.7 {LOG_COPIES}/ML
SPECIMEN TYPE: ABNORMAL
TACROLIMUS BLD-MCNC: 6.3 UG/L (ref 5–15)
TME LAST DOSE: NORMAL H
TME LAST DOSE: NORMAL H

## (undated) RX ORDER — DEXTROSE MONOHYDRATE 100 MG/ML
INJECTION, SOLUTION INTRAVENOUS
Status: DISPENSED
Start: 2024-02-20

## (undated) RX ORDER — SUCRALFATE 1 G/1
TABLET ORAL
Status: DISPENSED
Start: 2020-06-16

## (undated) RX ORDER — DEXTROSE MONOHYDRATE 25 G/50ML
INJECTION, SOLUTION INTRAVENOUS
Status: DISPENSED
Start: 2024-02-20

## (undated) RX ORDER — CALCIUM GLUCONATE 94 MG/ML
INJECTION, SOLUTION INTRAVENOUS
Status: DISPENSED
Start: 2024-02-20

## (undated) RX ORDER — FUROSEMIDE 10 MG/ML
INJECTION INTRAMUSCULAR; INTRAVENOUS
Status: DISPENSED
Start: 2024-02-20

## (undated) RX ORDER — CEFEPIME HYDROCHLORIDE 2 G/1
INJECTION, POWDER, FOR SOLUTION INTRAVENOUS
Status: DISPENSED
Start: 2024-02-20

## (undated) RX ORDER — LIDOCAINE HYDROCHLORIDE 20 MG/ML
SOLUTION OROPHARYNGEAL
Status: DISPENSED
Start: 2020-06-16

## (undated) RX ORDER — METRONIDAZOLE 500 MG/100ML
INJECTION, SOLUTION INTRAVENOUS
Status: DISPENSED
Start: 2024-02-20

## (undated) RX ORDER — ALBUTEROL SULFATE 0.83 MG/ML
SOLUTION RESPIRATORY (INHALATION)
Status: DISPENSED
Start: 2024-02-20

## (undated) RX ORDER — ALUMINA, MAGNESIA, AND SIMETHICONE 2400; 2400; 240 MG/30ML; MG/30ML; MG/30ML
SUSPENSION ORAL
Status: DISPENSED
Start: 2020-06-16